# Patient Record
Sex: MALE | Race: WHITE | Employment: FULL TIME | URBAN - METROPOLITAN AREA
[De-identification: names, ages, dates, MRNs, and addresses within clinical notes are randomized per-mention and may not be internally consistent; named-entity substitution may affect disease eponyms.]

---

## 2017-04-07 ENCOUNTER — APPOINTMENT (OUTPATIENT)
Dept: OCCUPATIONAL MEDICINE | Age: 27
End: 2017-04-07
Attending: EMERGENCY MEDICINE

## 2017-05-18 ENCOUNTER — OFFICE VISIT (OUTPATIENT)
Dept: FAMILY MEDICINE CLINIC | Facility: CLINIC | Age: 27
End: 2017-05-18

## 2017-05-18 ENCOUNTER — APPOINTMENT (OUTPATIENT)
Dept: LAB | Age: 27
End: 2017-05-18
Attending: FAMILY MEDICINE
Payer: COMMERCIAL

## 2017-05-18 VITALS
HEART RATE: 60 BPM | BODY MASS INDEX: 28 KG/M2 | SYSTOLIC BLOOD PRESSURE: 95 MMHG | DIASTOLIC BLOOD PRESSURE: 60 MMHG | WEIGHT: 206 LBS

## 2017-05-18 DIAGNOSIS — Z00.00 ANNUAL PHYSICAL EXAM: Primary | ICD-10-CM

## 2017-05-18 DIAGNOSIS — Z00.00 ANNUAL PHYSICAL EXAM: ICD-10-CM

## 2017-05-18 DIAGNOSIS — E10.9 TYPE 1 DIABETES MELLITUS WITHOUT COMPLICATION (HCC): ICD-10-CM

## 2017-05-18 DIAGNOSIS — Z23 NEED FOR VACCINATION: ICD-10-CM

## 2017-05-18 DIAGNOSIS — Q67.6 PECTUS EXCAVATUM: ICD-10-CM

## 2017-05-18 PROCEDURE — 87389 HIV-1 AG W/HIV-1&-2 AB AG IA: CPT

## 2017-05-18 PROCEDURE — 36415 COLL VENOUS BLD VENIPUNCTURE: CPT

## 2017-05-18 PROCEDURE — 90715 TDAP VACCINE 7 YRS/> IM: CPT | Performed by: FAMILY MEDICINE

## 2017-05-18 PROCEDURE — 99395 PREV VISIT EST AGE 18-39: CPT | Performed by: FAMILY MEDICINE

## 2017-05-18 PROCEDURE — 80061 LIPID PANEL: CPT

## 2017-05-18 PROCEDURE — 90471 IMMUNIZATION ADMIN: CPT | Performed by: FAMILY MEDICINE

## 2017-05-18 NOTE — PROGRESS NOTES
Physical    Using his pump. Patient's past medical surgical family social history was reviewed. Review of Systems  Allergic: no environmental allergies or food allergies  Cardiovascular: no chest pain, irregular heartbeat, or palpitations.   Constitut diabetes mellitus without complication ([facility] 75.)  referrals  - Alexsander  - Endocrine Referral - External    4. Pectus excavatum  Doesn't want surgery.

## 2017-05-19 ENCOUNTER — TELEPHONE (OUTPATIENT)
Dept: FAMILY MEDICINE CLINIC | Facility: CLINIC | Age: 27
End: 2017-05-19

## 2017-05-19 NOTE — TELEPHONE ENCOUNTER
----- Message from Gala Figueroa DO sent at 5/19/2017  4:12 PM CDT -----  HIV test was negative. Cholesterol test was normal.  Very good.

## 2018-12-07 NOTE — LETTER
03/11/20      Kenzie Alexander  [address]       Dear Moses Irby records indicate that you have outstanding lab work and or testing that was ordered for you and has not yet been completed:  Orders Placed This Encou Alert and interactive, no focal deficits

## 2019-07-05 ENCOUNTER — HOSPITAL (OUTPATIENT)
Dept: OTHER | Age: 29
End: 2019-07-05

## 2019-07-05 LAB — GLUCOSE BLDC GLUCOMTR-MCNC: 127 MG/DL (ref 65–99)

## 2020-01-09 ENCOUNTER — NURSE TRIAGE (OUTPATIENT)
Dept: INTERNAL MEDICINE CLINIC | Facility: CLINIC | Age: 30
End: 2020-01-09

## 2020-01-09 ENCOUNTER — OFFICE VISIT (OUTPATIENT)
Dept: FAMILY MEDICINE CLINIC | Facility: CLINIC | Age: 30
End: 2020-01-09
Payer: COMMERCIAL

## 2020-01-09 VITALS
HEART RATE: 99 BPM | DIASTOLIC BLOOD PRESSURE: 80 MMHG | WEIGHT: 211.81 LBS | RESPIRATION RATE: 17 BRPM | SYSTOLIC BLOOD PRESSURE: 123 MMHG | BODY MASS INDEX: 29 KG/M2

## 2020-01-09 DIAGNOSIS — E10.9 TYPE 1 DIABETES MELLITUS WITHOUT COMPLICATION (HCC): Primary | ICD-10-CM

## 2020-01-09 LAB
APPEARANCE: CLEAR
BILIRUBIN: NEGATIVE
GLUCOSE (URINE DIPSTICK): 500 MG/DL
GLUCOSE BLOOD: 361
KETONES (URINE DIPSTICK): NEGATIVE MG/DL
LEUKOCYTES: NEGATIVE
MULTISTIX LOT#: NORMAL NUMERIC
NITRITE, URINE: NEGATIVE
OCCULT BLOOD: NEGATIVE
PH, URINE: 6 (ref 4.5–8)
PROTEIN (URINE DIPSTICK): NEGATIVE MG/DL
SPECIFIC GRAVITY: 1.02 (ref 1–1.03)
TEST STRIP LOT #: NORMAL NUMERIC
URINE-COLOR: YELLOW
UROBILINOGEN,SEMI-QN: 0.2 MG/DL (ref 0–1.9)

## 2020-01-09 PROCEDURE — 82962 GLUCOSE BLOOD TEST: CPT | Performed by: PHYSICIAN ASSISTANT

## 2020-01-09 PROCEDURE — 99213 OFFICE O/P EST LOW 20 MIN: CPT | Performed by: PHYSICIAN ASSISTANT

## 2020-01-09 PROCEDURE — 36416 COLLJ CAPILLARY BLOOD SPEC: CPT | Performed by: PHYSICIAN ASSISTANT

## 2020-01-09 PROCEDURE — 81003 URINALYSIS AUTO W/O SCOPE: CPT | Performed by: PHYSICIAN ASSISTANT

## 2020-01-09 NOTE — PROGRESS NOTES
HPI:   HPI   30year-old male is here in the office complaining of hypoglycemia for the past 4 days. Patient states that he has not monitored BG daily. He was diagnosed with Type 1 DM for the past 10 years, DKA couple times.  His BG was 26 early audra blanco family h/o   • Diabetes Paternal Grandfather    • Prostate Cancer Paternal Grandfather    • Breast Cancer Paternal Grandmother        Social History:   Social History    Socioeconomic History      Marital status: Single      Spouse name: Not on file Bike Helmet: Not Asked        Seat Belt: Not Asked        Self-Exams: Not Asked    Social History Narrative      Not on file      Review of Systems:   Review of Systems   Constitutional: Negative for activity change, chills, fatigue and fever.    HENT: Judy Mariee Primary    Relevant Orders    GLUCOSE BLOOD TEST (Completed)    URINALYSIS, AUTO, W/O SCOPE (Completed)    ALT (SGPT)    AST (SGOT)    BASIC METABOLIC PANEL (8)    HEMOGLOBIN A1C    CBC WITH DIFFERENTIAL WITH PLATELET    ASSAY, THYROID STIM HORMONE    ENDO

## 2020-01-09 NOTE — TELEPHONE ENCOUNTER
Action Requested: Summary for Provider     []  Critical Lab, Recommendations Needed  [] Need Additional Advice  [x]   FYI    []   Need Orders  [] Need Medications Sent to Pharmacy  []  Other     SUMMARY: Type 1 insulin dependent patient who uses insulin pu

## 2020-01-10 ENCOUNTER — TELEPHONE (OUTPATIENT)
Dept: FAMILY MEDICINE CLINIC | Facility: CLINIC | Age: 30
End: 2020-01-10

## 2021-05-17 ENCOUNTER — HOSPITAL ENCOUNTER (INPATIENT)
Facility: HOSPITAL | Age: 31
LOS: 3 days | Discharge: HOME | DRG: 638 | End: 2021-05-20
Attending: EMERGENCY MEDICINE | Admitting: HOSPITALIST
Payer: COMMERCIAL

## 2021-05-17 DIAGNOSIS — E10.10 TYPE 1 DIABETES MELLITUS WITH KETOACIDOSIS WITHOUT COMA (HCC): Primary | ICD-10-CM

## 2021-05-17 PROCEDURE — 99223 1ST HOSP IP/OBS HIGH 75: CPT | Performed by: HOSPITALIST

## 2021-05-17 RX ORDER — ONDANSETRON 2 MG/ML
4 INJECTION INTRAMUSCULAR; INTRAVENOUS EVERY 4 HOURS PRN
Status: ACTIVE | OUTPATIENT
Start: 2021-05-17 | End: 2021-05-18

## 2021-05-17 RX ORDER — ONDANSETRON 2 MG/ML
4 INJECTION INTRAMUSCULAR; INTRAVENOUS EVERY 6 HOURS PRN
Status: DISCONTINUED | OUTPATIENT
Start: 2021-05-17 | End: 2021-05-20

## 2021-05-17 RX ORDER — ACETAMINOPHEN 325 MG/1
650 TABLET ORAL EVERY 6 HOURS PRN
Status: DISCONTINUED | OUTPATIENT
Start: 2021-05-17 | End: 2021-05-20

## 2021-05-17 RX ORDER — DEXTROSE MONOHYDRATE 25 G/50ML
50 INJECTION, SOLUTION INTRAVENOUS
Status: DISCONTINUED | OUTPATIENT
Start: 2021-05-17 | End: 2021-05-20

## 2021-05-17 RX ORDER — ENOXAPARIN SODIUM 100 MG/ML
40 INJECTION SUBCUTANEOUS DAILY
Status: DISCONTINUED | OUTPATIENT
Start: 2021-05-17 | End: 2021-05-20

## 2021-05-17 RX ORDER — TEMAZEPAM 7.5 MG/1
15 CAPSULE ORAL NIGHTLY PRN
Status: DISCONTINUED | OUTPATIENT
Start: 2021-05-17 | End: 2021-05-20

## 2021-05-17 RX ORDER — SODIUM CHLORIDE 9 MG/ML
INJECTION, SOLUTION INTRAVENOUS CONTINUOUS
Status: DISCONTINUED | OUTPATIENT
Start: 2021-05-17 | End: 2021-05-20

## 2021-05-17 RX ORDER — SODIUM CHLORIDE 9 MG/ML
INJECTION, SOLUTION INTRAVENOUS CONTINUOUS
Status: DISCONTINUED | OUTPATIENT
Start: 2021-05-17 | End: 2021-05-18

## 2021-05-17 RX ORDER — METOCLOPRAMIDE HYDROCHLORIDE 5 MG/ML
10 INJECTION INTRAMUSCULAR; INTRAVENOUS EVERY 6 HOURS PRN
Status: DISCONTINUED | OUTPATIENT
Start: 2021-05-17 | End: 2021-05-20

## 2021-05-17 RX ORDER — ONDANSETRON 2 MG/ML
4 INJECTION INTRAMUSCULAR; INTRAVENOUS ONCE
Status: COMPLETED | OUTPATIENT
Start: 2021-05-17 | End: 2021-05-17

## 2021-05-17 NOTE — ED INITIAL ASSESSMENT (HPI)
Patient complains of malaise since yesterday, vomiting as well, states his sugars have been in the 400s

## 2021-05-17 NOTE — H&P
[facility]    PATIENT'S NAME: Zurdo Barbour   ATTENDING PHYSICIAN: Vianney Oquendo.  Princess Fátima MD   PATIENT ACCOUNT#:   [id number]    LOCATION:  [place] [address]  MEDICAL RECORD #:   [id number]       YOB: 1990  ADMISSION DATE: Oropharynx is clear. Dry mucous membranes. Normal hard and soft palate. Eyes:  Anicteric sclerae. NECK:  Supple. No lymphadenopathy. Trachea is midline. Full range of motion. LUNGS:  Clear to auscultation bilaterally. Normal respiratory effort.   H

## 2021-05-18 PROCEDURE — 99233 SBSQ HOSP IP/OBS HIGH 50: CPT | Performed by: HOSPITALIST

## 2021-05-18 PROCEDURE — 99222 1ST HOSP IP/OBS MODERATE 55: CPT | Performed by: INTERNAL MEDICINE

## 2021-05-18 RX ORDER — DEXTROSE MONOHYDRATE 25 G/50ML
50 INJECTION, SOLUTION INTRAVENOUS
Status: DISCONTINUED | OUTPATIENT
Start: 2021-05-18 | End: 2021-05-20

## 2021-05-18 NOTE — CONSULTS
[facility]    Report of Consultation    Aura Garcia Patient Status:  Inpatient    1990 MRN [id number]   Location [facility] 2W/SW Attending Aaron Elizondo MD   Hosp Day # 1 PCP Felix Vega MD     Date of Admi Diabetes Paternal Grandfather    • Prostate Cancer Paternal Grandfather    • Breast Cancer Paternal Grandmother       reports that he has never smoked. He has never used smokeless tobacco. He reports current alcohol use.  He reports that he does not use jann conjunctivae, sclera. , normal sclera and normal pupils  Throat/Neck: normal sound to voice. Normal hearing, normal speech  Respiratory:  Speaking in full sentences, non-labored.  no increased work of breathing, no audible wheezing    Skin:  normal moisture

## 2021-05-18 NOTE — ED PROVIDER NOTES
Patient Seen in: [facility] AND [facility] Emergency Department    History   Patient presents with:  Hyperglycemia      HPI    Patient with a history of type 1 diabetes presents to the ED complaining of high blood sugar that started yesterday after eating at a r tobacco: Never Used    Substance and Sexual Activity      Alcohol use: Yes        Comment: rarely      Drug use: No    Other Topics      Concerns:        Caffeine Concern: Yes          soda      ROS  Pertinent positives: Fatigue, nausea, vomiting, thirst deformity. Skin:     General: Skin is warm and dry. Neurological:      Mental Status: He is alert and oriented to person, place, and time.    Psychiatric:         Mood and Affect: Mood normal.         Behavior: Behavior normal.         ED Course WITH PLATELET    Narrative: The following orders were created for panel order CBC WITH DIFFERENTIAL WITH PLATELET.                   Procedure                               Abnormality         Status                                     --------- problems on file. to contribute to the complexity of this ED evaluation. ED Course: Patient presents to the ED with symptoms and presentation concerning for DKA and severe dehydration. Started on IV fluids and laboratory testing sent.   Hyperglycemia on

## 2021-05-18 NOTE — PLAN OF CARE
Patient is alert and oriented x4. Admitted for high blood sugars. Currently on insulin drip. Accucheck hourly. Educated on call light usage. Bed locked in a low position.    Problem: Patient Centered Care  Goal: Patient preferences are identified and integr stability  - Monitor arterial and/or venous puncture sites for bleeding and/or hematoma  - Assess quality of pulses, skin color and temperature  - Assess for signs of decreased coronary artery perfusion - ex.  Angina  - Evaluate fluid balance, assess for ed urine specific gravity, serum osmolarity and serum sodium as indicated or ordered  - Monitor response to interventions for patient's volume status, including labs, urine output, blood pressure (other measures as available)  - Encourage oral intake as appro

## 2021-05-18 NOTE — PROGRESS NOTES
[facility]    Progress Note    Darlene Dumont Patient Status:  Inpatient    1990 MRN [id number]   Location [facility] 2W/SW Attending Candance Lewis, MD   Hosp Day # 1 PCP Timoteo Mckeon MD       Subjective:     No co

## 2021-05-18 NOTE — PLAN OF CARE
Pt was transitioned of the insulin drip to insulin pump at 1pm. Will ctm and possible DC today     Problem: Diabetes/Glucose Control  Goal: Glucose maintained within prescribed range  Description: INTERVENTIONS:  - Monitor Blood Glucose as ordered  - Asses maintain glucose within target range  - Assess barriers to adequate nutritional intake and initiate nutrition consult as needed  - Instruct patient on self management of diabetes  Outcome: Progressing  Goal: Electrolytes maintained within normal limits  Christian Pelayo

## 2021-05-18 NOTE — DIABETES ED
[facility]  Inpatient Diabetes Clinician Note    Henry Sharma Patient Status:  Inpatient   1990 MRN [id number]  Location [facility] 2W/SW Attending Vernell Raman, [facility] Day # 1 PCP Tova Lanier MD      Patient

## 2021-05-19 PROCEDURE — 99232 SBSQ HOSP IP/OBS MODERATE 35: CPT | Performed by: INTERNAL MEDICINE

## 2021-05-19 PROCEDURE — 99232 SBSQ HOSP IP/OBS MODERATE 35: CPT | Performed by: HOSPITALIST

## 2021-05-19 NOTE — PAYOR COMM NOTE
--------------  ADMISSION REVIEW     Payor: Yessenia Hobson  #:  [id number]  Authorization Number: [id number]    Admit date: 5/17/21  Admit time: 11:15 PM       Admitting Physician: Delaney Santana MD  Attending Physician:  Alberto Dasilva Relation Age of Onset   • Allergies Other         family h/o   • High Cholesterol Other         family h/o   • Cancer Other         family h/o   • Diabetes Paternal Grandfather    • Prostate Cancer Paternal Grandfather    • Breast Cancer Paternal Zulema Corinne Normocephalic and atraumatic. Mouth/Throat:      Mouth: Mucous membranes are dry. Eyes:      General:         Right eye: No discharge. Left eye: No discharge.       Conjunctiva/sclera: Conjunctivae normal.   Neck:      Trachea: No tracheal de other components within normal limits   POCT GLUCOSE - Abnormal; Notable for the following components:    POC Glucose  291 (*)     All other components within normal limits   POCT GLUCOSE - Abnormal; Notable for the following components:    POC Glucose  25 130/78 119/78   Pulse: (!) 121 (!) 123 (!) 124 (!) 122   Resp:       Temp:       SpO2: 100% 100% 99% 99%   Weight:         *I personally reviewed and interpreted all ED vitals.     Pulse Ox: 99%, Room air, Normal     Monitor Interpretation: Tachycardia    D (Principal) Type I diabetes mellitus ([facility] 75.) E10.9 3/15/2012 Unknown                        Signed by Constanza Ty MD on 5/17/2021  7:19 PM            H&P - H&P Note      H&P signed by Christian Milner MD at 5/18/2021 12:14 PM      Author: Lionel Torres chills. No chest pain. No recent change in his diet or his insulin pump settings. Other 12-point review of systems negative. PHYSICAL EXAMINATION:  GENERAL:  Alert, oriented to time, place, and person, in moderate distress.   VITAL SIGNS:  Temperature (Right Upper Arm) Roszko, Denver Lema, TOMMY      Insulin Aspart Pen (NOVOLOG) 100 UNIT/ML flexpen 5 Units     Date Action Dose Route User    5/19/2021 1258 Given 5 Units Subcutaneous (Left Upper Arm) [facility]    5/19/2021 0944 Given 5 Units Subcutan Consult:  5/18/2021     Reason for Consultation:  DKA     History of Present Illness:  Chano Heath is a a(n) 35year old male with PMH as below who presented to the ER with nausea, vomiting and hyperglycemia  Patient has had Type 1 DM for about 1 with ketoacidosis without coma [facility])        Patient is a 35year old male with Type 1 DM, who is admitted for management of DKA likely due to bad pump site  He was started on an insulin drip  BG better, AG resolved  Clinically better  Will switch to inulin

## 2021-05-19 NOTE — PLAN OF CARE
Off the insulin drip since this am and switched over to novolog 6 units pradial + low dose sliding scale and levemir 40 units. Accu checks were still on higher side. Dr. Pham Juárez increased levemir to 55 units and will monitor patient for one more night.  Pt s Obtain nutritional consult as needed  - Evaluate fluid balance  Outcome: Progressing     Problem: METABOLIC/FLUID AND ELECTROLYTES - ADULT  Goal: Glucose maintained within prescribed range  Description: INTERVENTIONS:  - Monitor Blood Glucose as ordered  -

## 2021-05-19 NOTE — DIABETES ED
Patient did a return demonstration of insulin pen technique with demo pen and pad. He verbalized appropriate site for injections and importance of site rotation.

## 2021-05-19 NOTE — PROGRESS NOTES
Patient admitted with DKA  Yesterday once AG closed , he was transitioned to the insulin pump under supervision of the diabetes educator.    However, shortly after that BG went up in the 400s and the patient went back into DKA inspite of the pump running  T

## 2021-05-19 NOTE — PROGRESS NOTES
[facility]    Progress Note    Lexa Mckeon Patient Status:  Inpatient    1990 MRN [id number]   Location [facility] 2W/SW Attending Vicente Barker MD   Hosp Day # 2 PCP Ally Vences MD       Subjective:     No co

## 2021-05-19 NOTE — PROGRESS NOTES
[facility]    Endocrine Progress Note  [facility]    Liset Quach Patient Status:  Inpatient    1990 MRN [id number]   Location [facility] 2W/SW Attending Charmayne Cranker, MD   Hosp Day # 2 PCP Sonu Romero Q15 Min PRN  •  benzocaine-menthol (CEPACOL (SUGAR-FREE)) 1 lozenge, 1 lozenge, Oral, PRN  •  Enoxaparin Sodium (LOVENOX) 40 MG/0.4ML injection 40 mg, 40 mg, Subcutaneous, Daily  •  acetaminophen (TYLENOL) tab 650 mg, 650 mg, Oral, Q6H PRN  •  temazepam (R No results found.            Jericho Caruso MD  5/19/2021

## 2021-05-19 NOTE — PLAN OF CARE
Patient is alert and oriented x4. Insulin drip restarted 2120 per Dr. Kush Hardy due to blood glucose levels in the 400s. Started in algorithm 2.    Problem: Patient Centered Care  Goal: Patient preferences are identified and integrated in the patient's plan effectiveness of vasoactive medications to optimize hemodynamic stability  - Monitor arterial and/or venous puncture sites for bleeding and/or hematoma  - Assess quality of pulses, skin color and temperature  - Assess for signs of decreased coronary artery deficit  - Monitor intake, output and patient weight  - Monitor urine specific gravity, serum osmolarity and serum sodium as indicated or ordered  - Monitor response to interventions for patient's volume status, including labs, urine output, blood pressure

## 2021-05-20 ENCOUNTER — TELEPHONE (OUTPATIENT)
Dept: FAMILY MEDICINE CLINIC | Facility: CLINIC | Age: 31
End: 2021-05-20

## 2021-05-20 VITALS
TEMPERATURE: 98 F | WEIGHT: 225 LBS | OXYGEN SATURATION: 97 % | BODY MASS INDEX: 31 KG/M2 | SYSTOLIC BLOOD PRESSURE: 146 MMHG | HEART RATE: 80 BPM | RESPIRATION RATE: 15 BRPM | DIASTOLIC BLOOD PRESSURE: 83 MMHG

## 2021-05-20 PROCEDURE — 99239 HOSP IP/OBS DSCHRG MGMT >30: CPT | Performed by: HOSPITALIST

## 2021-05-20 RX ORDER — LORAZEPAM 2 MG/ML
4 INJECTION INTRAMUSCULAR ONCE
Status: DISCONTINUED | OUTPATIENT
Start: 2021-05-20 | End: 2021-05-20

## 2021-05-20 NOTE — PLAN OF CARE
Problem: Patient Centered Care  Goal: Patient preferences are identified and integrated in the patient's plan of care  Description: Interventions:  - What would you like us to know as we care for you?  Lani Colin   - Provide timely, complete, and accurate inform See additional Care Plan goals for specific interventions  5/20/2021 1140 by Ruth Ann Sandhu RN  Outcome: Completed  5/20/2021 0948 by Everett Chauhan RN  Outcome: Progressing     Problem: CARDIOVASCULAR - ADULT  Goal: Maintains optimal cardiac management of diabetes  5/20/2021 1140 by Jessica Cuellar, RN  Outcome: Completed  5/20/2021 0948 by Everett Flores, RN  Outcome: Progressing  Goal: Electrolytes maintained within normal limits  Description: INTERVENTIONS:  - Monitor labs and rh

## 2021-05-20 NOTE — PROGRESS NOTES
[facility]    Endocrine Progress Note  [facility]    Teresa Luna Patient Status:  Inpatient    1990 MRN [id number]   Location [facility] 2W/SW Attending Ant Ghotra MD   Hosp Day # 3 PCP Valeria Worthy Q6H PRN  •  glucose (DEX4) oral liquid 15 g, 15 g, Oral, Q15 Min PRN **OR** Glucose-Vitamin C (DEX-4) chewable tab 4 tablet, 4 tablet, Oral, Q15 Min PRN **OR** dextrose 50 % injection 50 mL, 50 mL, Intravenous, Q15 Min PRN **OR** glucose (DEX4) oral liquid

## 2021-05-20 NOTE — PLAN OF CARE
Problem: Patient Centered Care  Goal: Patient preferences are identified and integrated in the patient's plan of care  Description: Interventions:  - What would you like us to know as we care for you?  I give myself insulin  - Provide timely, complete, an decreased cardiac output  - Evaluate effectiveness of vasoactive medications to optimize hemodynamic stability  - Monitor arterial and/or venous puncture sites for bleeding and/or hematoma  - Assess quality of pulses, skin color and temperature  - Assess f signs and symptoms of volume excess or deficit  - Monitor intake, output and patient weight  - Monitor urine specific gravity, serum osmolarity and serum sodium as indicated or ordered  - Monitor response to interventions for patient's volume status, inclu

## 2021-05-20 NOTE — DISCHARGE SUMMARY
[place] [facility]    Discharge Summary    Lukasz Ordaz Patient Status:  Inpatient    1990 MRN [id number]   Location [facility] 2W/SW Attending No att. providers found   Hosp Day # 3 PCP Evelyn Sanchez MD     Date of Admis .0 05/20/2021    CREATSERUM 0.62 (L) 05/20/2021    BUN 11 05/20/2021     05/20/2021    K 3.5 05/20/2021     05/20/2021    CO2 28.0 05/20/2021     (H) 05/20/2021    CA 8.4 (L) 05/20/2021    ALB 2.9 (L) 09/30/2015    ALKPHO 58 09/30 JACOB  [facility],  [address]  [address] [place] [phone]    On 5/27/2021  @11:30      Consultants  Chat With All Active Members    Provider Role Specialty    Katelyn Woods MD  Consulting Physician  ENDOCRINOLOGY    Yamil Edmond MD  Consulting Phy

## 2021-05-20 NOTE — PROGRESS NOTES
Discharge order placed AVS printed and reviewed with patient. Prescriptions in hand, Medications next doses due and medication side effects reviewed and questions answered. IVS and telemetry removed at this time. All questions answered at this time.

## 2021-05-20 NOTE — PAYOR COMM NOTE
--------------  DISCHARGE REVIEW    Payor: Yessenia [facility] #:  [id number]  Authorization Number: [id number]    Admit date: 5/17/21  Admit time:  11:15 PM  Discharge Date: 5/20/2021 12:06 PM     Admitting Physician: Carlotta Pizano MD  At

## 2021-05-20 NOTE — TELEPHONE ENCOUNTER
Insulin aspart and NovoLog are the same thing. It is the generic equivalent. He should be able to get the refill from the hospitalist.  The hospitalist sent in prescription for both the detemir and the aspart insulin preparations. For me to give him any further refills he would need to see me. I have never actually seen him in the office.

## 2021-05-20 NOTE — PLAN OF CARE
Problem: Patient Centered Care  Goal: Patient preferences are identified and integrated in the patient's plan of care  Description: Interventions:  - What would you like us to know as we care for you?   - Provide timely, complete, and accurate informatio output  - Evaluate effectiveness of vasoactive medications to optimize hemodynamic stability  - Monitor arterial and/or venous puncture sites for bleeding and/or hematoma  - Assess quality of pulses, skin color and temperature  - Assess for signs of decrea of volume excess or deficit  - Monitor intake, output and patient weight  - Monitor urine specific gravity, serum osmolarity and serum sodium as indicated or ordered  - Monitor response to interventions for patient's volume status, including labs, urine ou

## 2021-05-21 ENCOUNTER — PATIENT OUTREACH (OUTPATIENT)
Dept: CASE MANAGEMENT | Age: 31
End: 2021-05-21

## 2021-05-21 DIAGNOSIS — Z02.9 ENCOUNTERS FOR UNSPECIFIED ADMINISTRATIVE PURPOSE: ICD-10-CM

## 2021-05-24 NOTE — PROGRESS NOTES
NCM placed call to patient for TCM, LM requesting a call to [phone] back with a condition update.

## 2021-05-27 ENCOUNTER — OFFICE VISIT (OUTPATIENT)
Dept: FAMILY MEDICINE CLINIC | Facility: CLINIC | Age: 31
End: 2021-05-27
Payer: COMMERCIAL

## 2021-05-27 VITALS
SYSTOLIC BLOOD PRESSURE: 115 MMHG | BODY MASS INDEX: 34.53 KG/M2 | HEART RATE: 87 BPM | DIASTOLIC BLOOD PRESSURE: 66 MMHG | WEIGHT: 220 LBS | HEIGHT: 67 IN

## 2021-05-27 DIAGNOSIS — E10.10 TYPE 1 DIABETES MELLITUS WITH KETOACIDOSIS WITHOUT COMA (HCC): Primary | ICD-10-CM

## 2021-05-27 PROCEDURE — 3008F BODY MASS INDEX DOCD: CPT | Performed by: PHYSICIAN ASSISTANT

## 2021-05-27 PROCEDURE — 99495 TRANSJ CARE MGMT MOD F2F 14D: CPT | Performed by: PHYSICIAN ASSISTANT

## 2021-05-27 PROCEDURE — 3074F SYST BP LT 130 MM HG: CPT | Performed by: PHYSICIAN ASSISTANT

## 2021-05-27 PROCEDURE — 3078F DIAST BP <80 MM HG: CPT | Performed by: PHYSICIAN ASSISTANT

## 2021-05-27 RX ORDER — PEN NEEDLE, DIABETIC 29 G X1/2"
NEEDLE, DISPOSABLE MISCELLANEOUS
COMMUNITY
Start: 2021-05-20

## 2021-05-27 RX ORDER — INSULIN LISPRO 100 [IU]/ML
INJECTION, SOLUTION INTRAVENOUS; SUBCUTANEOUS
COMMUNITY
Start: 2021-05-20

## 2021-05-27 NOTE — PROGRESS NOTES
HPI:    Liset Quach is a 35year old male here today for hospital follow up.    He was discharged from Inpatient hospital, [facility] AND [facility]  to Home   Admission Date: 5/17/21   Discharge Date: 5/20/21  Hospital Discharge Diagnoses (since 4/27/202 Misc,   ONETOUCH ULTRA BLUE In Vitro Strip,     No current facility-administered medications on file prior to visit.         HISTORY: reconciled and reviewed with patient  He  has a past medical history of Fracture of tibia, Learning disability, Type I (juv 220 lb (99.8 kg)   BMI 34.46 kg/m²    GENERAL: well developed, well nourished, in no apparent distress  SKIN: no rashes, no suspicious lesions  HEENT: atraumatic, normocephalic, ears and throat are clear  EYES: PERRLA, EOMI, conjunctiva are clear  NECK: davila of Significant Complications, Morbidity, and/or Mortality: moderate    Overall Risk:   moderate    Patient seen within 7 days from date of discharge.      Charity Abbott PA-C, 5/27/2021

## 2021-05-28 ENCOUNTER — TELEPHONE (OUTPATIENT)
Dept: INTERNAL MEDICINE CLINIC | Facility: CLINIC | Age: 31
End: 2021-05-28

## 2021-05-28 RX ORDER — INSULIN LISPRO 100 [IU]/ML
15 INJECTION, SOLUTION INTRAVENOUS; SUBCUTANEOUS 3 TIMES DAILY
Qty: 14 EACH | Refills: 2 | Status: SHIPPED | OUTPATIENT
Start: 2021-05-28 | End: 2021-06-27

## 2021-05-28 NOTE — TELEPHONE ENCOUNTER
A prior Julianne Guerra has been started      NovoLog Flex Pen 100 unit/ml pen injectors     Key:[id number]

## 2021-06-01 NOTE — PROGRESS NOTES
Multiple attempts to reach pt and messages left with no return call. Pt went in for HFU appt on 5/27/21 with PCP. Encounter closing.

## 2021-06-17 ENCOUNTER — TELEPHONE (OUTPATIENT)
Dept: FAMILY MEDICINE CLINIC | Facility: CLINIC | Age: 31
End: 2021-06-17

## 2021-07-23 ENCOUNTER — TELEPHONE (OUTPATIENT)
Dept: FAMILY MEDICINE CLINIC | Facility: CLINIC | Age: 31
End: 2021-07-23

## 2021-07-23 NOTE — TELEPHONE ENCOUNTER
Medication was changed to Humalog on 05/28/2021. Prior authorization request is for Novolog not Humalog as indicated by CSS below.

## 2021-07-23 NOTE — TELEPHONE ENCOUNTER
Prior Authorization:      •  HUMALOG 100 UNIT/ML Subcutaneous Solution, INJECT 100 UNITS DAILY VIA THE INSULIN PUMP SUBCUTANEOUS, Disp: , Rfl:     Key: [id number]  Patient Last Name: Marcella Luz  : 1990

## 2022-05-31 ENCOUNTER — HOSPITAL ENCOUNTER (EMERGENCY)
Facility: HOSPITAL | Age: 32
Discharge: HOME | End: 2022-05-31
Attending: EMERGENCY MEDICINE
Payer: COMMERCIAL

## 2022-05-31 VITALS
HEART RATE: 67 BPM | SYSTOLIC BLOOD PRESSURE: 124 MMHG | RESPIRATION RATE: 17 BRPM | WEIGHT: 210 LBS | OXYGEN SATURATION: 94 % | TEMPERATURE: 98 F | DIASTOLIC BLOOD PRESSURE: 79 MMHG | BODY MASS INDEX: 33 KG/M2

## 2022-05-31 DIAGNOSIS — E16.2 HYPOGLYCEMIA: Primary | ICD-10-CM

## 2022-05-31 DIAGNOSIS — T85.614A: ICD-10-CM

## 2022-05-31 LAB
ANION GAP SERPL CALC-SCNC: 4 MMOL/L (ref 0–18)
BASOPHILS # BLD AUTO: 0.02 X10(3) UL (ref 0–0.2)
BASOPHILS NFR BLD AUTO: 0.2 %
BUN BLD-MCNC: 14 MG/DL (ref 7–18)
BUN/CREAT SERPL: 15.9 (ref 10–20)
CALCIUM BLD-MCNC: 8.9 MG/DL (ref 8.5–10.1)
CHLORIDE SERPL-SCNC: 109 MMOL/L (ref 98–112)
CO2 SERPL-SCNC: 26 MMOL/L (ref 21–32)
CREAT BLD-MCNC: 0.88 MG/DL
DEPRECATED RDW RBC AUTO: 49 FL (ref 35.1–46.3)
EOSINOPHIL # BLD AUTO: 0.01 X10(3) UL (ref 0–0.7)
EOSINOPHIL NFR BLD AUTO: 0.1 %
ERYTHROCYTE [DISTWIDTH] IN BLOOD BY AUTOMATED COUNT: 14.6 % (ref 11–15)
GLUCOSE BLD-MCNC: 34 MG/DL (ref 70–99)
GLUCOSE BLDC GLUCOMTR-MCNC: 114 MG/DL (ref 70–99)
GLUCOSE BLDC GLUCOMTR-MCNC: 141 MG/DL (ref 70–99)
GLUCOSE BLDC GLUCOMTR-MCNC: 26 MG/DL (ref 70–99)
HCT VFR BLD AUTO: 49.5 %
HGB BLD-MCNC: 15.3 G/DL
IMM GRANULOCYTES # BLD AUTO: 0.02 X10(3) UL (ref 0–1)
IMM GRANULOCYTES NFR BLD: 0.2 %
LYMPHOCYTES # BLD AUTO: 1.42 X10(3) UL (ref 1–4)
LYMPHOCYTES NFR BLD AUTO: 17.6 %
MCH RBC QN AUTO: 28.2 PG (ref 26–34)
MCHC RBC AUTO-ENTMCNC: 30.9 G/DL (ref 31–37)
MCV RBC AUTO: 91.3 FL
MONOCYTES # BLD AUTO: 0.47 X10(3) UL (ref 0.1–1)
MONOCYTES NFR BLD AUTO: 5.8 %
NEUTROPHILS # BLD AUTO: 6.14 X10 (3) UL (ref 1.5–7.7)
NEUTROPHILS # BLD AUTO: 6.14 X10(3) UL (ref 1.5–7.7)
NEUTROPHILS NFR BLD AUTO: 76.1 %
OSMOLALITY SERPL CALC.SUM OF ELEC: 285 MOSM/KG (ref 275–295)
PLATELET # BLD AUTO: 171 10(3)UL (ref 150–450)
POTASSIUM SERPL-SCNC: 3.9 MMOL/L (ref 3.5–5.1)
RBC # BLD AUTO: 5.42 X10(6)UL
SODIUM SERPL-SCNC: 139 MMOL/L (ref 136–145)
WBC # BLD AUTO: 8.1 X10(3) UL (ref 4–11)

## 2022-05-31 PROCEDURE — 96374 THER/PROPH/DIAG INJ IV PUSH: CPT

## 2022-05-31 PROCEDURE — 82962 GLUCOSE BLOOD TEST: CPT

## 2022-05-31 PROCEDURE — 99284 EMERGENCY DEPT VISIT MOD MDM: CPT

## 2022-05-31 PROCEDURE — 85025 COMPLETE CBC W/AUTO DIFF WBC: CPT | Performed by: EMERGENCY MEDICINE

## 2022-05-31 PROCEDURE — 80048 BASIC METABOLIC PNL TOTAL CA: CPT | Performed by: EMERGENCY MEDICINE

## 2022-05-31 RX ORDER — DEXTROSE MONOHYDRATE 25 G/50ML
INJECTION, SOLUTION INTRAVENOUS
Status: COMPLETED
Start: 2022-05-31 | End: 2022-05-31

## 2022-05-31 RX ORDER — DEXTROSE MONOHYDRATE 25 G/50ML
50 INJECTION, SOLUTION INTRAVENOUS ONCE
Status: COMPLETED | OUTPATIENT
Start: 2022-05-31 | End: 2022-05-31

## 2022-05-31 NOTE — ED INITIAL ASSESSMENT (HPI)
Patient states his family told him he had unresponsive episode pta, states he has been having problems with his insulin pump, states it read 45 pta, patient is alert and oriented x3 at present, denies symptoms

## 2022-11-11 ENCOUNTER — HOSPITAL ENCOUNTER (OUTPATIENT)
Facility: HOSPITAL | Age: 32
Setting detail: OBSERVATION
Discharge: HOME | End: 2022-11-12
Attending: EMERGENCY MEDICINE | Admitting: HOSPITALIST
Payer: COMMERCIAL

## 2022-11-11 DIAGNOSIS — E10.65 TYPE 1 DIABETES MELLITUS WITH HYPERGLYCEMIA (HCC): Primary | ICD-10-CM

## 2022-11-11 LAB
ANION GAP SERPL CALC-SCNC: 11 MMOL/L (ref 0–18)
BASOPHILS # BLD AUTO: 0.01 X10(3) UL (ref 0–0.2)
BASOPHILS NFR BLD AUTO: 0.1 %
BILIRUB UR QL: NEGATIVE
BUN BLD-MCNC: 22 MG/DL (ref 7–18)
BUN/CREAT SERPL: 20.4 (ref 10–20)
CALCIUM BLD-MCNC: 8.8 MG/DL (ref 8.5–10.1)
CHLORIDE SERPL-SCNC: 102 MMOL/L (ref 98–112)
CLARITY UR: CLEAR
CO2 SERPL-SCNC: 23 MMOL/L (ref 21–32)
COLOR UR: YELLOW
CREAT BLD-MCNC: 1.08 MG/DL
DEPRECATED RDW RBC AUTO: 41.6 FL (ref 35.1–46.3)
EOSINOPHIL # BLD AUTO: 0.03 X10(3) UL (ref 0–0.7)
EOSINOPHIL NFR BLD AUTO: 0.4 %
ERYTHROCYTE [DISTWIDTH] IN BLOOD BY AUTOMATED COUNT: 12.9 % (ref 11–15)
EST. AVERAGE GLUCOSE BLD GHB EST-MCNC: 117 MG/DL (ref 68–126)
FLUAV + FLUBV RNA SPEC NAA+PROBE: NEGATIVE
FLUAV + FLUBV RNA SPEC NAA+PROBE: NEGATIVE
GFR SERPLBLD BASED ON 1.73 SQ M-ARVRAT: 94 ML/MIN/1.73M2 (ref 60–?)
GLUCOSE BLD-MCNC: 311 MG/DL (ref 70–99)
GLUCOSE BLDC GLUCOMTR-MCNC: 132 MG/DL (ref 70–99)
GLUCOSE BLDC GLUCOMTR-MCNC: 133 MG/DL (ref 70–99)
GLUCOSE BLDC GLUCOMTR-MCNC: 144 MG/DL (ref 70–99)
GLUCOSE BLDC GLUCOMTR-MCNC: 166 MG/DL (ref 70–99)
GLUCOSE BLDC GLUCOMTR-MCNC: 226 MG/DL (ref 70–99)
GLUCOSE BLDC GLUCOMTR-MCNC: 232 MG/DL (ref 70–99)
GLUCOSE BLDC GLUCOMTR-MCNC: 297 MG/DL (ref 70–99)
GLUCOSE BLDC GLUCOMTR-MCNC: 48 MG/DL (ref 70–99)
GLUCOSE BLDC GLUCOMTR-MCNC: 61 MG/DL (ref 70–99)
GLUCOSE BLDC GLUCOMTR-MCNC: 87 MG/DL (ref 70–99)
GLUCOSE BLDC GLUCOMTR-MCNC: 95 MG/DL (ref 70–99)
GLUCOSE UR-MCNC: >=500 MG/DL
HBA1C MFR BLD: 5.7 % (ref ?–5.7)
HCT VFR BLD AUTO: 47.3 %
HGB BLD-MCNC: 16 G/DL
HGB UR QL STRIP.AUTO: NEGATIVE
IMM GRANULOCYTES # BLD AUTO: 0.02 X10(3) UL (ref 0–1)
IMM GRANULOCYTES NFR BLD: 0.3 %
KETONES UR-MCNC: 80 MG/DL
LEUKOCYTE ESTERASE UR QL STRIP.AUTO: NEGATIVE
LYMPHOCYTES # BLD AUTO: 0.92 X10(3) UL (ref 1–4)
LYMPHOCYTES NFR BLD AUTO: 12.9 %
MCH RBC QN AUTO: 29.6 PG (ref 26–34)
MCHC RBC AUTO-ENTMCNC: 33.8 G/DL (ref 31–37)
MCV RBC AUTO: 87.6 FL
MONOCYTES # BLD AUTO: 0.28 X10(3) UL (ref 0.1–1)
MONOCYTES NFR BLD AUTO: 3.9 %
NEUTROPHILS # BLD AUTO: 5.88 X10 (3) UL (ref 1.5–7.7)
NEUTROPHILS # BLD AUTO: 5.88 X10(3) UL (ref 1.5–7.7)
NEUTROPHILS NFR BLD AUTO: 82.4 %
NITRITE UR QL STRIP.AUTO: NEGATIVE
OSMOLALITY SERPL CALC.SUM OF ELEC: 297 MOSM/KG (ref 275–295)
PH UR: 5 [PH] (ref 5–8)
PLATELET # BLD AUTO: 155 10(3)UL (ref 150–450)
POTASSIUM SERPL-SCNC: 4.6 MMOL/L (ref 3.5–5.1)
PROT UR-MCNC: NEGATIVE MG/DL
RBC # BLD AUTO: 5.4 X10(6)UL
RSV RNA SPEC NAA+PROBE: NEGATIVE
SARS-COV-2 RNA RESP QL NAA+PROBE: NOT DETECTED
SODIUM SERPL-SCNC: 136 MMOL/L (ref 136–145)
SP GR UR STRIP: 1.03 (ref 1–1.03)
UROBILINOGEN UR STRIP-ACNC: <2
VIT C UR-MCNC: NEGATIVE MG/DL
WBC # BLD AUTO: 7.1 X10(3) UL (ref 4–11)

## 2022-11-11 PROCEDURE — 99219 INITIAL OBSERVATION CARE,LEVL II: CPT | Performed by: HOSPITALIST

## 2022-11-11 RX ORDER — NICOTINE POLACRILEX 4 MG
15 LOZENGE BUCCAL
Status: DISCONTINUED | OUTPATIENT
Start: 2022-11-11 | End: 2022-11-12

## 2022-11-11 RX ORDER — NICOTINE POLACRILEX 4 MG
30 LOZENGE BUCCAL
Status: DISCONTINUED | OUTPATIENT
Start: 2022-11-11 | End: 2022-11-12

## 2022-11-11 RX ORDER — TEMAZEPAM 15 MG/1
15 CAPSULE ORAL NIGHTLY PRN
Status: DISCONTINUED | OUTPATIENT
Start: 2022-11-11 | End: 2022-11-12

## 2022-11-11 RX ORDER — SODIUM CHLORIDE 9 MG/ML
INJECTION, SOLUTION INTRAVENOUS CONTINUOUS
Status: DISCONTINUED | OUTPATIENT
Start: 2022-11-11 | End: 2022-11-12

## 2022-11-11 RX ORDER — INSULIN ASPART 100 [IU]/ML
0.1 INJECTION, SOLUTION INTRAVENOUS; SUBCUTANEOUS ONCE
Status: COMPLETED | OUTPATIENT
Start: 2022-11-11 | End: 2022-11-11

## 2022-11-11 RX ORDER — ENOXAPARIN SODIUM 100 MG/ML
40 INJECTION SUBCUTANEOUS DAILY
Status: DISCONTINUED | OUTPATIENT
Start: 2022-11-11 | End: 2022-11-12

## 2022-11-11 RX ORDER — ACETAMINOPHEN 500 MG
500 TABLET ORAL EVERY 4 HOURS PRN
Status: DISCONTINUED | OUTPATIENT
Start: 2022-11-11 | End: 2022-11-12

## 2022-11-11 RX ORDER — PROCHLORPERAZINE EDISYLATE 5 MG/ML
5 INJECTION INTRAMUSCULAR; INTRAVENOUS EVERY 8 HOURS PRN
Status: DISCONTINUED | OUTPATIENT
Start: 2022-11-11 | End: 2022-11-12

## 2022-11-11 RX ORDER — ONDANSETRON 2 MG/ML
4 INJECTION INTRAMUSCULAR; INTRAVENOUS ONCE
Status: COMPLETED | OUTPATIENT
Start: 2022-11-11 | End: 2022-11-11

## 2022-11-11 RX ORDER — DEXTROSE MONOHYDRATE 25 G/50ML
50 INJECTION, SOLUTION INTRAVENOUS
Status: DISCONTINUED | OUTPATIENT
Start: 2022-11-11 | End: 2022-11-12

## 2022-11-11 RX ORDER — SODIUM CHLORIDE 9 MG/ML
125 INJECTION, SOLUTION INTRAVENOUS CONTINUOUS
Status: DISCONTINUED | OUTPATIENT
Start: 2022-11-11 | End: 2022-11-12

## 2022-11-11 RX ORDER — ONDANSETRON 2 MG/ML
4 INJECTION INTRAMUSCULAR; INTRAVENOUS EVERY 6 HOURS PRN
Status: DISCONTINUED | OUTPATIENT
Start: 2022-11-11 | End: 2022-11-12

## 2022-11-11 RX ORDER — INSULIN ASPART 100 [IU]/ML
0.15 INJECTION, SOLUTION INTRAVENOUS; SUBCUTANEOUS ONCE
Status: DISCONTINUED | OUTPATIENT
Start: 2022-11-11 | End: 2022-11-11

## 2022-11-11 NOTE — H&P
[facility]    PATIENT'S NAME: Yane Luz   ATTENDING PHYSICIAN: Hanna Galvez MD   PATIENT ACCOUNT#:   [de-identified]    LOCATION:  [address]  MEDICAL RECORD #:   [id number]       YOB: 1990  ADMISSION DATE:       11/11/2022    HISTORY AND PHYSICAL EXAMINATION    CHIEF COMPLAINT:  Hyperglycemia, malfunctioning insulin pump. HISTORY OF PRESENT ILLNESS:  The patient is a 67-year-old  male with diabetes mellitus type 1 who presented to the emergency department for evaluation of progressive hyperglycemia since yesterday despite attempts to bolus himself and change the insulin pump location. This morning he felt nauseous with abdominal cramps and vomited 2 times. Came into the emergency department for evaluation. CBC was unremarkable. Chemistry unremarkable. Bicarb 23. He had small acetone in his blood. Glucose 232 and 311 on chemistry panel. The patient was started on IV fluids and given 10 units of short-acting insulin, and he will be admitted to hospital for further management. Insulin pump was switched off. PAST MEDICAL HISTORY:  Diabetes mellitus type 1 insulin pump-dependent, obstructive sleep apnea. Denies any other medical problems. PAST SURGICAL HISTORY:  Tonsillectomy, nasal septoplasty, lip surgery, adenoidectomy. MEDICATIONS:  Please see medication reconciliation list.    FAMILY HISTORY:  Positive for diabetes mellitus type 2 in both parents. SOCIAL HISTORY:  No tobacco, alcohol, or drug use. Lives with his family. Independent in his basic activities of daily living. REVIEW OF SYSTEMS:  As mentioned above, persistent hyperglycemia despite bolusing himself. He was able to bring it down to 99 last night after bolusing, but this morning woke up with 268 blood glucose. He felt abdominal cramps and vomited twice. Other 12-point review of systems negative. The patient did not change his diet in the last 2 to 3 days.       PHYSICAL EXAMINATION:    GENERAL:  Alert. Oriented to time, place, and person. No acute distress. VITAL SIGNS:  Temperature 98.0, pulse 78, respiratory rate 14, blood pressure 122/76, pulse ox 100% on room air. HEENT:  Atraumatic. Oropharynx clear, dry mucous membranes. Ears, nose normal.  Eyes:  Anicteric sclerae. NECK:  Supple. No lymphadenopathy. Trachea midline. Full range of motion. LUNGS:  Clear to auscultation bilaterally. Normal respiratory effort. HEART:  Regular rate and rhythm. S1, S2 auscultated. No murmur. ABDOMEN:  Soft, nondistended, no tenderness. Positive bowel sounds. EXTREMITIES:  No peripheral edema, clubbing, or cyanosis. NEUROLOGIC:  Motor and sensory intact. ASSESSMENT AND PLAN:  Possible malfunctioning insulin pump. Basal rate 25.5 per hour. We will start on 15 units b.i.d. of detemir and sliding scale, endocrinology consult, IV fluids, nausea control. Further recommendations to follow.     Dictated By Gerardo Chaidez MD  d: 11/11/2022 11:14:49  t: 11/11/2022 11:19:34  Job [id number]  FB/

## 2022-11-11 NOTE — ED INITIAL ASSESSMENT (HPI)
Patient complains of abd pain, headache, vomiting since yesterday, states his sugars have been reading in the 300s since last night

## 2022-11-12 VITALS
RESPIRATION RATE: 18 BRPM | BODY MASS INDEX: 33 KG/M2 | SYSTOLIC BLOOD PRESSURE: 119 MMHG | OXYGEN SATURATION: 99 % | DIASTOLIC BLOOD PRESSURE: 82 MMHG | WEIGHT: 209 LBS | HEART RATE: 66 BPM | TEMPERATURE: 98 F

## 2022-11-12 LAB
ANION GAP SERPL CALC-SCNC: 4 MMOL/L (ref 0–18)
BASOPHILS # BLD AUTO: 0.02 X10(3) UL (ref 0–0.2)
BASOPHILS NFR BLD AUTO: 0.4 %
BUN BLD-MCNC: 16 MG/DL (ref 7–18)
BUN/CREAT SERPL: 15.8 (ref 10–20)
CALCIUM BLD-MCNC: 8 MG/DL (ref 8.5–10.1)
CHLORIDE SERPL-SCNC: 107 MMOL/L (ref 98–112)
CO2 SERPL-SCNC: 27 MMOL/L (ref 21–32)
CREAT BLD-MCNC: 1.01 MG/DL
DEPRECATED RDW RBC AUTO: 41.5 FL (ref 35.1–46.3)
EOSINOPHIL # BLD AUTO: 0.06 X10(3) UL (ref 0–0.7)
EOSINOPHIL NFR BLD AUTO: 1.3 %
ERYTHROCYTE [DISTWIDTH] IN BLOOD BY AUTOMATED COUNT: 13.1 % (ref 11–15)
GFR SERPLBLD BASED ON 1.73 SQ M-ARVRAT: 102 ML/MIN/1.73M2 (ref 60–?)
GLUCOSE BLD-MCNC: 287 MG/DL (ref 70–99)
GLUCOSE BLDC GLUCOMTR-MCNC: 246 MG/DL (ref 70–99)
GLUCOSE BLDC GLUCOMTR-MCNC: 58 MG/DL (ref 70–99)
GLUCOSE BLDC GLUCOMTR-MCNC: 75 MG/DL (ref 70–99)
GLUCOSE BLDC GLUCOMTR-MCNC: 85 MG/DL (ref 70–99)
HCT VFR BLD AUTO: 42.5 %
HGB BLD-MCNC: 14.1 G/DL
IMM GRANULOCYTES # BLD AUTO: 0.01 X10(3) UL (ref 0–1)
IMM GRANULOCYTES NFR BLD: 0.2 %
LYMPHOCYTES # BLD AUTO: 1.12 X10(3) UL (ref 1–4)
LYMPHOCYTES NFR BLD AUTO: 24.9 %
MCH RBC QN AUTO: 28.9 PG (ref 26–34)
MCHC RBC AUTO-ENTMCNC: 33.2 G/DL (ref 31–37)
MCV RBC AUTO: 87.1 FL
MONOCYTES # BLD AUTO: 0.27 X10(3) UL (ref 0.1–1)
MONOCYTES NFR BLD AUTO: 6 %
NEUTROPHILS # BLD AUTO: 3.01 X10 (3) UL (ref 1.5–7.7)
NEUTROPHILS # BLD AUTO: 3.01 X10(3) UL (ref 1.5–7.7)
NEUTROPHILS NFR BLD AUTO: 67.2 %
OSMOLALITY SERPL CALC.SUM OF ELEC: 298 MOSM/KG (ref 275–295)
PLATELET # BLD AUTO: 149 10(3)UL (ref 150–450)
POTASSIUM SERPL-SCNC: 4.6 MMOL/L (ref 3.5–5.1)
RBC # BLD AUTO: 4.88 X10(6)UL
SODIUM SERPL-SCNC: 138 MMOL/L (ref 136–145)
WBC # BLD AUTO: 4.5 X10(3) UL (ref 4–11)

## 2022-11-12 PROCEDURE — 99217 OBSERVATION CARE DISCHARGE: CPT | Performed by: HOSPITALIST

## 2022-11-12 NOTE — PLAN OF CARE
HS blood sugar 95, snack and juice provided. Patient hypoglycemic early this AM that improved after glucose drink and snack. Ambulating ad mary. Insulin pump and CGM maintained and in place. Discharge home once medically cleared. Problem: Patient Centered Care  Goal: Patient preferences are identified and integrated in the patient's plan of care  Description: Interventions:  - What would you like us to know as we care for you?  \"I have my insulin pump and CGM on\"  - Provide timely, complete, and accurate information to patient/family  - Incorporate patient and family knowledge, values, beliefs, and cultural backgrounds into the planning and delivery of care  - Encourage patient/family to participate in care and decision-making at the level they choose  - Honor patient and family perspectives and choices  Outcome: Progressing     Problem: Patient/Family Goals  Goal: Patient/Family Long Term Goal  Description: Patient's Long Term Goal: return home    Interventions:  - insulin pump monitoring  - See additional Care Plan goals for specific interventions  Outcome: Progressing  Goal: Patient/Family Short Term Goal  Description: Patient's Short Term Goal: maintain normal blood sugar levels    Interventions:   - accucheck ACHS  - See additional Care Plan goals for specific interventions  Outcome: Progressing     Problem: Diabetes/Glucose Control  Goal: Glucose maintained within prescribed range  Description: INTERVENTIONS:  - Monitor Blood Glucose as ordered  - Assess for signs and symptoms of hyperglycemia and hypoglycemia  - Administer ordered medications to maintain glucose within target range  - Assess barriers to adequate nutritional intake and initiate nutrition consult as needed  - Instruct patient on self management of diabetes  Outcome: Progressing

## 2022-11-12 NOTE — RESPIRATORY THERAPY NOTE
BART ASSESSMENT:    Pt. does have a previous diagnosis of BART.  Pt does not routinely use a CPAP device at home    CPAP INITIATION:    Pt to be placed on CPAP: no  Pt refused: yes

## 2022-11-12 NOTE — DISCHARGE SUMMARY
[facility]    Discharge Summary    [facility] Patient Status:  Observation    1990 MRN [id number]   Location [facility] 3W/SW Attending Isaias Gaviria MD   Hosp Day # 0 PCP Júnior Koenig MD     Date of Admission: 2022      Date of Discharge: 22    Admitting Diagnosis: Type 1 diabetes mellitus with hyperglycemia [facility]) [E10.65]    Hospital Discharge Diagnoses: DM type 1    Lace+ Score: 32  59-90 High Risk  29-58 Medium Risk  0-28   Low Risk. TCM Follow-Up Recommendation:  LACE 29-58: Moderate Risk of readmission after discharge from the hospital.          Problem List: Patient Active Problem List:     Type I diabetes mellitus ([facility] [address]     Sleep apnea     Diabetic ketoacidosis without coma associated with type 1 diabetes mellitus (HCC)     Pectus excavatum     Type 1 diabetes mellitus with ketoacidosis without coma (HCC)     Type 1 diabetes mellitus with hyperglycemia (HCC)        Physical Exam:     Gen: No acute distress, alert and oriented x3  Pulm: Lungs clear, normal respiratory effort  CV: Heart with regular rate and rhythm  Abd: Abdomen soft, nontender, nondistended, bowel sounds present  Neuro: No acute focal deficits  MSK: Full range of motion in extremities  Skin: Warm and dry  Psych: Normal affect  Ext: no c/c/e      History of Present Illness: Per Dr Francine Adams    The patient is a 71-year-old  male with diabetes mellitus type 1 who presented to the emergency department for evaluation of progressive hyperglycemia since yesterday despite attempts to bolus himself and change the insulin pump location. This morning he felt nauseous with abdominal cramps and vomited 2 times. Came into the emergency department for evaluation. CBC was unremarkable. Chemistry unremarkable. Bicarb 23. He had small acetone in his blood. Glucose 232 and 311 on chemistry panel.   The patient was started on IV fluids and given 10 units of short-acting insulin, and he will be admitted to hospital for further management. Insulin pump was switched off. Hospital Course: Patient was seen by endocrinology. Insulin pump was adjusted and patients blood sugars improved and his symptoms improved. Stable for home.      Discharge Condition: Stable    Discharge Medications:      Discharge Medications      CONTINUE taking these medications      Instructions Prescription details   BD Insulin Syringe U/F 30G X 1/2\" 0.3 ML Misc  Generic drug: Insulin Syringe-Needle U-100       Refills: 0     BD Pen Needle Short U/F 31G X 8 MM Misc  Generic drug: Insulin Pen Needle       Refills: 0     HumaLOG 100 UNIT/ML Soln  Generic drug: insulin lispro      INJECT 100 UNITS DAILY VIA THE INSULIN PUMP SUBCUTANEOUS   Refills: 0     OneTouch Ultra Blue Strp       Refills: 0                Queenie Mccallum MD  11/12/2022  1:04 PM    Greater than 30 minutes spent on preparation and coordination of this discharge

## 2022-11-14 ENCOUNTER — PATIENT OUTREACH (OUTPATIENT)
Dept: CASE MANAGEMENT | Age: 32
End: 2022-11-14

## 2022-11-14 NOTE — PROGRESS NOTES
NCM placed call to patient for TCM, LM requesting a call back to [phone] with a condition update.

## 2022-11-15 NOTE — PROGRESS NOTES
NCM placed call to patient for TCM, LM requesting a call back to [phone] with a condition update.

## 2022-11-16 NOTE — PROGRESS NOTES
Left message on mailbox for pt to call NCM back for TCM. NCM contact information included in message.         Follow up With Specialties Details Why Anne Marie Ríos MD ENDOCRINOLOGY Schedule an appointment as soon as possible for a visit in 1 week(s)  [address] [place] [address] Sirena Bolaños   [phone]

## 2023-03-27 PROCEDURE — 3044F HG A1C LEVEL LT 7.0%: CPT | Performed by: PHYSICIAN ASSISTANT

## 2023-03-27 PROCEDURE — 3061F NEG MICROALBUMINURIA REV: CPT | Performed by: PHYSICIAN ASSISTANT

## 2023-03-28 LAB
ALBUMIN/GLOBULIN RATIO: 1.7 (CALC) (ref 1–2.5)
ALBUMIN: 4.1 G/DL (ref 3.6–5.1)
ALKALINE PHOSPHATASE: 60 U/L (ref 36–130)
ALT: 31 U/L (ref 9–46)
AST: 31 U/L (ref 10–40)
BILIRUBIN, TOTAL: 0.9 MG/DL (ref 0.2–1.2)
BUN: 14 MG/DL (ref 7–25)
CALCIUM: 9 MG/DL (ref 8.6–10.3)
CARBON DIOXIDE: 28 MMOL/L (ref 20–32)
CHLORIDE: 104 MMOL/L (ref 98–110)
CHOL/HDLC RATIO: 2.3 (CALC)
CHOLESTEROL, TOTAL: 132 MG/DL
CREATININE, RANDOM URINE: 113 MG/DL (ref 20–320)
CREATININE: 0.87 MG/DL (ref 0.6–1.26)
EGFR: 118 ML/MIN/1.73M2
GLOBULIN: 2.4 G/DL (CALC) (ref 1.9–3.7)
GLUCOSE: 101 MG/DL (ref 65–99)
HDL CHOLESTEROL: 57 MG/DL
HEMOGLOBIN A1C: 6 % OF TOTAL HGB
LDL-CHOLESTEROL: 66 MG/DL (CALC)
MICROALBUMIN/CREATININE RATIO, RANDOM URINE: 5 MCG/MG CREAT
MICROALBUMIN: 0.6 MG/DL
NON-HDL CHOLESTEROL: 75 MG/DL (CALC)
POTASSIUM: 4.1 MMOL/L (ref 3.5–5.3)
PROTEIN, TOTAL: 6.5 G/DL (ref 6.1–8.1)
SODIUM: 139 MMOL/L (ref 135–146)
TRIGLYCERIDES: 33 MG/DL

## 2023-04-05 ENCOUNTER — OFFICE VISIT (OUTPATIENT)
Dept: FAMILY MEDICINE CLINIC | Facility: CLINIC | Age: 33
End: 2023-04-05

## 2023-04-05 VITALS
DIASTOLIC BLOOD PRESSURE: 68 MMHG | HEIGHT: 67 IN | SYSTOLIC BLOOD PRESSURE: 103 MMHG | BODY MASS INDEX: 35.47 KG/M2 | HEART RATE: 69 BPM | WEIGHT: 226 LBS

## 2023-04-05 DIAGNOSIS — Z00.00 ROUTINE GENERAL MEDICAL EXAMINATION AT A HEALTH CARE FACILITY: Primary | ICD-10-CM

## 2023-04-05 DIAGNOSIS — E55.9 VITAMIN D DEFICIENCY: ICD-10-CM

## 2023-04-05 DIAGNOSIS — E10.9 TYPE 1 DIABETES MELLITUS WITHOUT COMPLICATION (HCC): ICD-10-CM

## 2023-04-05 PROCEDURE — 3078F DIAST BP <80 MM HG: CPT | Performed by: PHYSICIAN ASSISTANT

## 2023-04-05 PROCEDURE — 90471 IMMUNIZATION ADMIN: CPT | Performed by: PHYSICIAN ASSISTANT

## 2023-04-05 PROCEDURE — 3008F BODY MASS INDEX DOCD: CPT | Performed by: PHYSICIAN ASSISTANT

## 2023-04-05 PROCEDURE — 99395 PREV VISIT EST AGE 18-39: CPT | Performed by: PHYSICIAN ASSISTANT

## 2023-04-05 PROCEDURE — 3074F SYST BP LT 130 MM HG: CPT | Performed by: PHYSICIAN ASSISTANT

## 2023-04-05 PROCEDURE — 90677 PCV20 VACCINE IM: CPT | Performed by: PHYSICIAN ASSISTANT

## 2023-06-02 ENCOUNTER — PATIENT MESSAGE (OUTPATIENT)
Dept: FAMILY MEDICINE CLINIC | Facility: CLINIC | Age: 33
End: 2023-06-02

## 2023-06-02 NOTE — TELEPHONE ENCOUNTER
Min, please advise on pended letter. ----- Message -----  From: Shreyas Pérez  Sent: 6/2/2023   6:15 AM CDT  To: Em Triage Support  Subject: Mario White I'm going to need a doctor's note from you informing work that I can keep my phone near me due to the [address] they want me to put it in the locker and I will not be able to hear it go off if my Sugar goes low.

## 2023-08-16 ENCOUNTER — OFFICE VISIT (OUTPATIENT)
Dept: PODIATRY CLINIC | Facility: CLINIC | Age: 33
End: 2023-08-16

## 2023-08-16 DIAGNOSIS — L60.8 DISCOLORATION AND THICKENING OF NAILS BOTH FEET: Primary | ICD-10-CM

## 2023-08-16 DIAGNOSIS — E10.65 TYPE 1 DIABETES MELLITUS WITH HYPERGLYCEMIA (HCC): ICD-10-CM

## 2023-08-16 PROCEDURE — 99203 OFFICE O/P NEW LOW 30 MIN: CPT | Performed by: PODIATRIST

## 2023-08-16 PROCEDURE — 11755 BIOPSY NAIL UNIT: CPT | Performed by: PODIATRIST

## 2023-08-16 NOTE — PROGRESS NOTES
[facility]  Progress Note    Armando Huff is a 33year old male. Patient presents with:  Diabetic Foot Care: Copnsult - last UqV3R=0.0 from 3/27/23 and LOV with endo Dr Isamar Arias was 4/10/23 - he thinks he has toenail fungus on the R foot 3rd toe - this AM his BS=68        HPI:     This is a pleasant type 1 diabetic male. He presents to clinic today for diabetic foot exam.  He is also concerned for possible fungal toenails. He states this has been present for a few months and worsening. He has been putting antifungal cream which has slightly helped. Allergies: Patient has no known allergies.    Current Outpatient Medications   Medication Sig Dispense Refill    HUMALOG 100 UNIT/ML Subcutaneous Solution INJECT 100 UNITS DAILY VIA THE INSULIN PUMP SUBCUTANEOUS      BD INSULIN SYRINGE U/F 30G X 1/2\" 0.3 ML Does not apply Misc       BD PEN NEEDLE SHORT U/F 31G X 8 MM Does not apply Misc       ONETOUCH ULTRA BLUE In Vitro Strip         Past Medical History:   Diagnosis Date    Fracture of tibia     Learning disability     mild    Type I (juvenile type) diabetes mellitus without mention of complication, not stated as uncontrolled     Diagnosed 2011    Varicella 1995      Past Surgical History:   Procedure Laterality Date    ADENOIDECTOMY      LIP SURGERY PROC UNLISTED  2010    excision of Lip lesion    MYRINGOTOMY, LASER-ASSISTED  2006    and tube placement    OTHER SURGICAL HISTORY      tonsillectomy    REPAIR OF NASAL SEPTUM  2005    Septoplasty    TONSILLECTOMY        Family History   Problem Relation Age of Onset    Allergies Other         family h/o    High Cholesterol Other         family h/o    Cancer Other         family h/o    Diabetes Paternal Grandfather     Prostate Cancer Paternal Grandfather     Breast Cancer Paternal Grandmother       Social History    Socioeconomic History      Marital status: Single      Number of children: 0    Occupational History      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 EXAM:   There were no vitals taken for this visit. Constitutional:   Patient in no apparent distress. Well kept. Of normal body habitus. Alert and oriented to person, place, and time. Vascular Examination:  DP pulse is 2/4  PT pulse is 2/4  Capillary refill is immediate  Integumentary Examination:   The patient's nails appear thickened, elongated, dystrophic, discolored with subungual debris 1-5 right, 1-5  left nails. Digital hair growth is present left and is present right. Skin is of good turgor  Neurological Examination:  Monofilament (10-g) sensation is 5/5 to right and 5/5 to left. Sharp/dull is present to right and is present to left. Parasthesias absent. Musculoskeletal Examination:  Muscle Strength is 5/5. LABS & IMAGING:     Lab Results   Component Value Date     (H) 03/27/2023    BUN 14 03/27/2023    CREATSERUM 0.87 03/27/2023    BUNCREA NOT APPLICABLE 95/85/8460    ANIONGAP 4 11/12/2022    GFRAA 133 05/31/2022    GFRNAA 115 05/31/2022    CA 9.0 03/27/2023     03/27/2023    K 4.1 03/27/2023     03/27/2023    CO2 28 03/27/2023    OSMOCALC 298 (H) 11/12/2022        Lab Results   Component Value Date     11/11/2022    A1C 6.0 (H) 03/27/2023        No results found.      ASSESSMENT AND PLAN:   Diagnoses and all orders for this visit:    Discoloration and thickening of nails both feet    Type 1 diabetes mellitus with hyperglycemia ([facility] [address]        Plan:     Diabetic education and instructions have been provided. We reviewed and discussed the following:    -risk categories related to pts with diabetes and foot or lower extremity complications per ADA. -adherence to medication regimen and close monitoring or blood sugar control.   -daily monitoring/inspection of feet and shoes.   -proper management of diet and weight   -regular follow up with PCP and specialty providers as recommended   -Lower extremity complications related to DM were reviewed and stressed prevention. Reviewed treatment options for nail dystrophy / onychomycosis including:   No treatment and monitoring, topical meds, oral meds, nail removal  Advantages and disadvantages of each reviewed. Using oral agents would require regular blood test monitoring due to risk of hepatotoxicity and other adverse reactions including drug interactions. Topical meds are much safer yet carry very low efficacy. CMP March of 2023 revealed normal AST/ALT    Pt would like to proceed with nail biopsy  Procedure: Nail Biopsy 73306  Using nail forceps a portion of the left hallux toenail was excised and sent to pathology for PAS stain. Pt tolerated the procedure well without complication. If nail biopsy is positive for fungus pt would like to proceed with oral lamisil x 90 days. Will call patient with nail biopsy results and if positive for fungus will prescribe oral lamisil. No follow-ups on file.     Kaden Luz DPM  8/16/2023

## 2023-08-18 ENCOUNTER — TELEPHONE (OUTPATIENT)
Dept: PODIATRY CLINIC | Facility: CLINIC | Age: 33
End: 2023-08-18

## 2023-08-18 RX ORDER — TERBINAFINE HYDROCHLORIDE 250 MG/1
250 TABLET ORAL DAILY
Qty: 90 TABLET | Refills: 0 | Status: SHIPPED | OUTPATIENT
Start: 2023-08-18 | End: 2023-11-16

## 2023-08-18 NOTE — TELEPHONE ENCOUNTER
Please inform patient that his nail biopsy was positive for fungus. I did prescribe oral terbinafine for 90 days to his pharmacy. He is to take as prescribed. Please have him make f/u appt in 3 months for re evaluation.

## 2023-09-12 ENCOUNTER — PATIENT MESSAGE (OUTPATIENT)
Dept: FAMILY MEDICINE CLINIC | Facility: CLINIC | Age: 33
End: 2023-09-12

## 2023-09-14 ENCOUNTER — TELEPHONE (OUTPATIENT)
Dept: FAMILY MEDICINE CLINIC | Facility: CLINIC | Age: 33
End: 2023-09-14

## 2023-09-14 ENCOUNTER — OFFICE VISIT (OUTPATIENT)
Dept: FAMILY MEDICINE CLINIC | Facility: CLINIC | Age: 33
End: 2023-09-14

## 2023-09-14 VITALS
HEART RATE: 76 BPM | BODY MASS INDEX: 37.2 KG/M2 | SYSTOLIC BLOOD PRESSURE: 119 MMHG | HEIGHT: 67 IN | DIASTOLIC BLOOD PRESSURE: 77 MMHG | WEIGHT: 237 LBS

## 2023-09-14 DIAGNOSIS — E78.2 MIXED HYPERLIPIDEMIA: ICD-10-CM

## 2023-09-14 DIAGNOSIS — E10.9 TYPE 1 DIABETES MELLITUS WITHOUT COMPLICATION (HCC): Primary | ICD-10-CM

## 2023-09-14 PROBLEM — E10.65 TYPE 1 DIABETES MELLITUS WITH HYPERGLYCEMIA (HCC): Status: RESOLVED | Noted: 2022-11-11 | Resolved: 2023-09-14

## 2023-09-14 PROCEDURE — 3074F SYST BP LT 130 MM HG: CPT | Performed by: PHYSICIAN ASSISTANT

## 2023-09-14 PROCEDURE — 3078F DIAST BP <80 MM HG: CPT | Performed by: PHYSICIAN ASSISTANT

## 2023-09-14 PROCEDURE — 99213 OFFICE O/P EST LOW 20 MIN: CPT | Performed by: PHYSICIAN ASSISTANT

## 2023-09-14 PROCEDURE — 90686 IIV4 VACC NO PRSV 0.5 ML IM: CPT | Performed by: PHYSICIAN ASSISTANT

## 2023-09-14 PROCEDURE — 3008F BODY MASS INDEX DOCD: CPT | Performed by: PHYSICIAN ASSISTANT

## 2023-09-14 PROCEDURE — 90471 IMMUNIZATION ADMIN: CPT | Performed by: PHYSICIAN ASSISTANT

## 2023-09-14 RX ORDER — ATORVASTATIN CALCIUM 20 MG/1
20 TABLET, FILM COATED ORAL DAILY
COMMUNITY

## 2023-09-14 NOTE — TELEPHONE ENCOUNTER
Pt dropped FMLA form. HIPAA signed Fee collected.  Forms scanned to KANDY and originals sent to Abner

## 2023-09-18 NOTE — TELEPHONE ENCOUNTER
Pts fmla and received and logged for processing. HIPAA received is incomplete and scanned into pts chart.  Sent [facility] for [facility]

## 2023-09-28 NOTE — TELEPHONE ENCOUNTER
Type of Leave:  Intermittent  Reason for Leave: Diabetic  Start date of leave: 09/28/23   How much time needed?: flare up 1-3 times a month 1 whole day  Forms Due Date: Whenever  Was Fee and Turnaround info Given?: Yes

## 2023-09-28 NOTE — TELEPHONE ENCOUNTER
Marek Fernandez     Patient is requesting for intermittent leave starting 09/28/23 due to diabetes, patient is requesting 1-3 flare up per month flare ups lasting 24 hours. Do you support?        Thank you    Lazara

## 2023-09-29 NOTE — TELEPHONE ENCOUNTER
Only as needed.  If patient is off work more often ( more than 2 times/ month), form needs to fill out by his Endocrinologist.

## 2023-09-29 NOTE — TELEPHONE ENCOUNTER
Patient is requesting for a reduced schedule and flare ups per my chart message, called patient LMTCB for more details and to explain to him NP message regarding flare ups.

## 2023-10-05 NOTE — TELEPHONE ENCOUNTER
Spoke with pt. Pt advised of message below per ARY Fallon. Pt is also requesting to work 9 hrs per day Monday-Friday. Pt would like all request since to Dr. Mary Jane Cabello. Pt verbalized understanding.

## 2023-10-06 NOTE — TELEPHONE ENCOUNTER
Dr. Rudy Cosby     Patient is requesting for intermittent leaving starting 09/28/23 due to diabetes, patient is requesting 1-3 flare ups per month each flare up lasting 24 hours. Patient is also requesting to work 9 hrs per day Monday-Friday. Do you support?      Thank you,   Lazara

## 2023-10-24 NOTE — TELEPHONE ENCOUNTER
Dr. Nacho Dow      Patient is requesting for intermittent leaving starting 09/28/23 due to diabetes, patient is requesting 1-3 flare ups per month each flare up lasting 24 hours. Patient is also requesting to work 9 hrs per day Monday-Friday. Do you support?       Thank you,   Lazara

## 2023-10-26 NOTE — TELEPHONE ENCOUNTER
Please try to avoid signing forms in the corner as it is not visible when printing and forms are not accepted this way. Thank you! Dr. Schroeder Running     *The ACKNOWLEDGE button has been moved to the top right ribbon*    Please sign off on form if you agree to: 2 forms need signature FMLA and Disability as discussed below  (place your signature on the first page only)    -From your Inbasket, Highlight the patient and click Chart   -Double click the 37/27/6224 Forms Completion telephone encounter  -Scroll down to the Media section   -Click the blue Hyperlink: FMLA Dr Schroeder Running 10/26/23and Disability Dr Schroeder Running 48/62/84   -Click Acknowledge located in the top right ribbon/menu   -Drag the mouse into the blank space of the document and a + sign will appear. Left click to   electronically sign the document.      Thank you,      Lazara

## 2023-11-08 ENCOUNTER — OFFICE VISIT (OUTPATIENT)
Dept: PODIATRY CLINIC | Facility: CLINIC | Age: 33
End: 2023-11-08
Payer: COMMERCIAL

## 2023-11-08 DIAGNOSIS — B35.1 ONYCHOMYCOSIS: Primary | ICD-10-CM

## 2023-11-08 DIAGNOSIS — E10.65 TYPE 1 DIABETES MELLITUS WITH HYPERGLYCEMIA (HCC): ICD-10-CM

## 2023-11-08 PROCEDURE — 99213 OFFICE O/P EST LOW 20 MIN: CPT | Performed by: PODIATRIST

## 2023-11-08 NOTE — PROGRESS NOTES
[address]  Progress Note    Kris Torres is a 29year old male. Chief Complaint   Patient presents with    Toenail Fungus     Bilateral feet f/u - he is on medication and he thinks they lock a little better - this AM his TV=598         HPI:     This is a pleasant type 1 diabetic male. He presents to clinic today for fungal toenail check. He has 1 more week of the oral lamisil and notes improvement. Allergies: Patient has no known allergies. Current Outpatient Medications   Medication Sig Dispense Refill    atorvastatin 20 MG Oral Tab Take 1 tablet (20 mg total) by mouth daily. terbinafine 250 MG Oral Tab Take 1 tablet (250 mg total) by mouth daily.  90 tablet 0    HUMALOG 100 UNIT/ML Subcutaneous Solution INJECT 100 UNITS DAILY VIA THE INSULIN PUMP SUBCUTANEOUS      BD INSULIN SYRINGE U/F 30G X 1/2\" 0.3 ML Does not apply Misc       BD PEN NEEDLE SHORT U/F 31G X 8 MM Does not apply Misc       ONETOUCH ULTRA BLUE In Vitro Strip         Past Medical History:   Diagnosis Date    Fracture of tibia     Learning disability     mild    Type I (juvenile type) diabetes mellitus without mention of complication, not stated as uncontrolled     Diagnosed 2011    Varicella 1995      Past Surgical History:   Procedure Laterality Date    ADENOIDECTOMY      LIP SURGERY PROC UNLISTED  2010    excision of Lip lesion    MYRINGOTOMY, LASER-ASSISTED  2006    and tube placement    OTHER SURGICAL HISTORY      tonsillectomy    REPAIR OF NASAL SEPTUM  2005    Septoplasty    TONSILLECTOMY        Family History   Problem Relation Age of Onset    Allergies Other         family h/o    High Cholesterol Other         family h/o    Cancer Other         family h/o    Diabetes Paternal Grandfather     Prostate Cancer Paternal Grandfather     Breast Cancer Paternal Grandmother       Social History     Socioeconomic History    Marital status: Single    Number of children: 0   Occupational History Occupation: Laid off currently - Heating/AC, [employer]   Tobacco Use    Smoking status: Never    Smokeless tobacco: Never   Vaping Use    Vaping Use: Never used   Substance and Sexual Activity    Alcohol use: Yes     Comment: rarely    Drug use: No   Other Topics Concern    Caffeine Concern Yes     Comment: soda           REVIEW OF SYSTEMS:   Denies nausea, fever, chills  No calf pain  No other muscle or joint aches  Denies chest pain or shortness of breath. EXAM:   There were no vitals taken for this visit. Constitutional:   Patient in no apparent distress. Well kept. Of normal body habitus. Alert and oriented to person, place, and time. Vascular Examination:  DP pulse is 2/4  PT pulse is 2/4  Capillary refill is immediate  Integumentary Examination:   The patient's nails appear thickened, elongated, dystrophic, discolored with subungual debris 1-5 right, 1-5  left nails, improved with clearing noted at toenail bases  Digital hair growth is present left and is present right. Skin is of good turgor  Neurological Examination:  Monofilament (10-g) sensation is 5/5 to right and 5/5 to left. Sharp/dull is present to right and is present to left. Parasthesias absent. Musculoskeletal Examination:  Muscle Strength is 5/5. LABS & IMAGING:     Lab Results   Component Value Date     (H) 03/27/2023    BUN 14 03/27/2023    CREATSERUM 0.87 03/27/2023    BUNCREA NOT APPLICABLE 65/03/8143    ANIONGAP 4 11/12/2022    GFRAA 133 05/31/2022    GFRNAA 115 05/31/2022    CA 9.0 03/27/2023     03/27/2023    K 4.1 03/27/2023     03/27/2023    CO2 28 03/27/2023    OSMOCALC 298 (H) 11/12/2022        Lab Results   Component Value Date     11/11/2022    A1C 6.0 (H) 03/27/2023        No results found.      ASSESSMENT AND PLAN:   Diagnoses and all orders for this visit:    Onychomycosis    Type 1 diabetes mellitus with hyperglycemia ([facility] [address]          Plan:     Reviewed treatment options for nail dystrophy / onychomycosis including:   No treatment and monitoring, topical meds, oral meds, nail removal  Advantages and disadvantages of each reviewed. Using oral agents would require regular blood test monitoring due to risk of hepatotoxicity and other adverse reactions including drug interactions. Topical meds are much safer yet carry very low efficacy. CMP March of 2023 revealed normal AST/ALT    Toenail biopsy on 8/16/23 was positive for fungus    Pt to cont oral lamisil he has 1 more week left    RTC 6 months for toenail fungus check      No follow-ups on file.     Gabriela Eduardo, WENDY  11/8/23

## 2023-11-09 ENCOUNTER — OFFICE VISIT (OUTPATIENT)
Dept: OPHTHALMOLOGY | Facility: CLINIC | Age: 33
End: 2023-11-09
Payer: COMMERCIAL

## 2023-11-09 DIAGNOSIS — E10.9 TYPE 1 DIABETES MELLITUS WITHOUT COMPLICATION (HCC): Primary | ICD-10-CM

## 2023-11-09 DIAGNOSIS — H25.013 CORTICAL AGE-RELATED CATARACT OF BOTH EYES: ICD-10-CM

## 2023-11-09 PROCEDURE — 3072F LOW RISK FOR RETINOPATHY: CPT | Performed by: OPHTHALMOLOGY

## 2023-11-09 PROCEDURE — 2023F DILAT RTA XM W/O RTNOPTHY: CPT | Performed by: OPHTHALMOLOGY

## 2023-11-09 PROCEDURE — 92004 COMPRE OPH EXAM NEW PT 1/>: CPT | Performed by: OPHTHALMOLOGY

## 2023-11-09 NOTE — PROGRESS NOTES
Kassie Whitt is a 29year old male. HPI:     HPI    NP is here for diabetic eye exam.  Pt was last seen by Optometrist about 6 months ago. Pt did not bring his distance glasses with him. He uses them mostly for night driving. Pt denies any eye trouble or vision changes. Pt has been a diabetic for 14 years       Pt's diabetes is currently controlled by insulin  Pt checks BS every day   Pt's last blood sugar was  108 this morning  Last HA1C was 6.0 on 3/27/23  Endocrinologist: Laura Avalos           Last edited by Adriana Cano O.TNimesh on 11/9/2023 10:14 AM.        Patient History:  Past Medical History:   Diagnosis Date    Fracture of tibia     Learning disability     mild    Type I (juvenile type) diabetes mellitus without mention of complication, not stated as uncontrolled     Diagnosed 2011    Varicella 1995       Surgical History: Kassie Whitt has a past surgical history that includes repair of nasal septum (2005) (Septoplasty); myringotomy, laser-assisted (2006) (and tube placement); tonsillectomy; adenoidectomy; lip surgery proc unlisted (2010) (excision of Lip lesion); and other surgical history (tonsillectomy).     Family History   Problem Relation Age of Onset    Allergies Other         family h/o    High Cholesterol Other         family h/o    Cancer Other         family h/o    Diabetes Paternal Grandfather     Prostate Cancer Paternal Grandfather     Breast Cancer Paternal Grandmother        Social History:   Social History     Socioeconomic History    Marital status: Single    Number of children: 0   Occupational History    Occupation: Laid off currently - Heating/AC, Landscaping   Tobacco Use    Smoking status: Never    Smokeless tobacco: Never   Vaping Use    Vaping Use: Never used   Substance and Sexual Activity    Alcohol use: Yes     Comment: rarely    Drug use: No   Other Topics Concern    Caffeine Concern Yes     Comment: soda       Medications:  Current Outpatient Medications   Medication Sig Dispense Refill    atorvastatin 20 MG Oral Tab Take 1 tablet (20 mg total) by mouth daily. terbinafine 250 MG Oral Tab Take 1 tablet (250 mg total) by mouth daily.  90 tablet 0    HUMALOG 100 UNIT/ML Subcutaneous Solution INJECT 100 UNITS DAILY VIA THE INSULIN PUMP SUBCUTANEOUS      BD INSULIN SYRINGE U/F 30G X 1/2\" 0.3 ML Does not apply Misc       BD PEN NEEDLE SHORT U/F 31G X 8 MM Does not apply Misc       ONETOUCH ULTRA BLUE In Vitro Strip          Allergies:  No Known Allergies    ROS:       PHYSICAL EXAM:     Base Eye Exam       Visual Acuity (Snellen - Linear)         Right Left    Dist sc 20/40 -2 20/20 -1    Near sc 20/20 20/20              Tonometry (Icare, 10:17 AM)         Right Left    Pressure 12 10              Pupils         Pupils    Right PERRL    Left PERRL              Visual Fields         Left Right     Full Full              Extraocular Movement         Right Left     Full, Ortho Full, Ortho              Neuro/Psych       Oriented x3: Yes    Mood/Affect: Normal              Dilation       Both eyes: 1.0% Mydriacyl and 2.5% Tripp Synephrine @ 10:17 AM                  Slit Lamp and Fundus Exam       Slit Lamp Exam         Right Left    Lids/Lashes Meibomian gland dysfunction Meibomian gland dysfunction    Conjunctiva/Sclera Normal Normal    Cornea Clear Clear    Anterior Chamber Deep and quiet Deep and quiet    Iris Normal Normal    Lens 1+ anterior cortical, 1+ PSC 1+ anterior cortical, trace PSC    Vitreous Clear Clear              Fundus Exam         Right Left    Disc Good rim Good rim    C/D Ratio 0.3 0.3    Macula Normal-no BDR Normal-no BDR    Vessels Normal Normal    Periphery Normal Normal                  Refraction       Wearing Rx       Type: Forgot glasses              Manifest Refraction (Auto)         Sphere Cylinder Axis    Right +0.75 +1.75 095    Left +1.00 +0.25 100   Patient is happy with his glasses which are 7 months old ASSESSMENT/PLAN:     Diagnoses and Plan:     Type 1 diabetes mellitus without complication (HCC)  Diabetes type I: no background retinopathy, no signs of neovascularization noted. Discussed ocular and systemic benefits of blood sugar control. Will see patient in 1 year for a diabetic exam    Cortical age-related cataract of both eyes  Discussed early cataracts with patient. No treatment recommended at this time. Continue with same glasses    No orders of the defined types were placed in this encounter.       Meds This Visit:  Requested Prescriptions      No prescriptions requested or ordered in this encounter        Follow up instructions:  Return in about 1 year (around 11/9/2024) for Diabetic eye exam.    11/9/2023  Scribed by: Sabrian Oconnor MD

## 2023-11-09 NOTE — PATIENT INSTRUCTIONS
Type 1 diabetes mellitus without complication (HCC)  Diabetes type I: no background retinopathy, no signs of neovascularization noted. Discussed ocular and systemic benefits of blood sugar control. Will see patient in 1 year for a diabetic exam    Cortical age-related cataract of both eyes  Discussed early cataracts with patient. No treatment recommended at this time.      Continue with same glasses

## 2023-11-10 NOTE — TELEPHONE ENCOUNTER
Please try to avoid signing forms in the corner as it is not visible when printing and forms are not accepted this way. Thank you! Dr. Lucio Carreno     *The ACKNOWLEDGE button has been moved to the top right ribbon*     Please sign off on form if you agree to: 2 forms need signature FMLA and Disability as discussed below  (place your signature on the first page only)     -From your Inbasket, Highlight the patient and click Chart   -Double click the [id number] Forms Completion telephone encounter  -Scroll down to the Media section   -Click the blue Hyperlink: FMLA Dr Lucio Carreno 10/26/23and Disability Dr Lucio Carreno 09/91/22   -Click Acknowledge located in the top right ribbon/menu   -Drag the mouse into the blank space of the document and a + sign will appear. Left click to   electronically sign the document.      Thank you,        Lazara

## 2023-11-13 NOTE — TELEPHONE ENCOUNTER
Forms completed and Faxed to [facility] at [phone] and [employer] at [phone].  MyChart sent to patient confirmation received

## 2024-02-13 LAB
ALBUMIN/GLOBULIN RATIO: 1.4 (CALC) (ref 1–2.5)
ALBUMIN: 4 G/DL (ref 3.6–5.1)
ALKALINE PHOSPHATASE: 67 U/L (ref 36–130)
ALT: 39 U/L (ref 9–46)
AST: 41 U/L (ref 10–40)
BILIRUBIN, TOTAL: 0.4 MG/DL (ref 0.2–1.2)
BUN: 17 MG/DL (ref 7–25)
CALCIUM: 8.9 MG/DL (ref 8.6–10.3)
CARBON DIOXIDE: 28 MMOL/L (ref 20–32)
CHLORIDE: 102 MMOL/L (ref 98–110)
CHOL/HDLC RATIO: 2.7 (CALC)
CHOLESTEROL, TOTAL: 138 MG/DL
CREATININE: 0.89 MG/DL (ref 0.6–1.26)
EGFR: 117 ML/MIN/1.73M2
GLOBULIN: 2.8 G/DL (CALC) (ref 1.9–3.7)
GLUCOSE: 179 MG/DL (ref 65–99)
HDL CHOLESTEROL: 51 MG/DL
HEMOGLOBIN A1C: 6.8 % OF TOTAL HGB
LDL-CHOLESTEROL: 66 MG/DL (CALC)
NON-HDL CHOLESTEROL: 87 MG/DL (CALC)
POTASSIUM: 4.4 MMOL/L (ref 3.5–5.3)
PROTEIN, TOTAL: 6.8 G/DL (ref 6.1–8.1)
SODIUM: 139 MMOL/L (ref 135–146)
TRIGLYCERIDES: 119 MG/DL

## 2024-03-22 NOTE — PLAN OF CARE
Received from ED, alert & Ox4. Insulin pump and CGM intact, reviewed by Dr. Sonja Oconnor in ED. Continuing to monitor the efficiency of the insulin pump. In ED received 10 units Novolog. On admit accucheck was normal. By time food arrived, patients CGM alerted him that blood glucose was 48, our accucheck confirmed blood glucose of 48, treated with glucose liquid as ordered. Dr. Sonja Oconnor notified and insulin pens discontinued per order. Goal is to monitor the pump and adjust accordingly so pump can continue to be used at home. Tolerating meals. Will continue to monitor. Problem: Patient Centered Care  Goal: Patient preferences are identified and integrated in the patient's plan of care  Description: Interventions:  - What would you like us to know as we care for you?  \"I have my insulin pump and CGM on\"  - Provide timely, complete, and accurate information to patient/family  - Incorporate patient and family knowledge, values, beliefs, and cultural backgrounds into the planning and delivery of care  - Encourage patient/family to participate in care and decision-making at the level they choose  - Honor patient and family perspectives and choices  Outcome: Progressing     Problem: Patient/Family Goals  Goal: Patient/Family Long Term Goal  Description: Patient's Long Term Goal: return home    Interventions:  - insulin pump monitoring  - See additional Care Plan goals for specific interventions  Outcome: Not Progressing  Goal: Patient/Family Short Term Goal  Description: Patient's Short Term Goal: maintain normal blood sugar levels    Interventions:   - accucheck ACHS  - See additional Care Plan goals for specific interventions  Outcome: Not Progressing     Problem: Diabetes/Glucose Control  Goal: Glucose maintained within prescribed range  Description: INTERVENTIONS:  - Monitor Blood Glucose as ordered  - Assess for signs and symptoms of hyperglycemia and hypoglycemia  - Administer ordered medications to maintain glucose Signed pended referral   Chronic issue- note sure why it was sent as high priority - this is not urgent or emergent     The reason I say this is bc we get a lot of msg and when sent as high priority, we try to get to those first usually in between seeing pts in a busy clinic so it difficult to filter which msg to do first if they are for f/u questions or simple referrals    within target range  - Assess barriers to adequate nutritional intake and initiate nutrition consult as needed  - Instruct patient on self management of diabetes  Outcome: Not Progressing

## 2024-05-08 ENCOUNTER — OFFICE VISIT (OUTPATIENT)
Dept: PODIATRY CLINIC | Facility: CLINIC | Age: 34
End: 2024-05-08
Payer: COMMERCIAL

## 2024-05-08 DIAGNOSIS — B35.1 ONYCHOMYCOSIS: Primary | ICD-10-CM

## 2024-05-08 DIAGNOSIS — E10.65 TYPE 1 DIABETES MELLITUS WITH HYPERGLYCEMIA (HCC): ICD-10-CM

## 2024-05-08 PROCEDURE — 99213 OFFICE O/P EST LOW 20 MIN: CPT | Performed by: PODIATRIST

## 2024-05-08 NOTE — PROGRESS NOTES
[facility] Podiatry  Progress Note    Reymundo Pitts is a 33 year old male.   Chief Complaint   Patient presents with    Toenail Fungus     B/l foot f/u - Pt states medication helped but fungus still there. No pain  FBS- 106- A1C:6.8 on 2/12         HPI:     This is a pleasant type 1 diabetic male.      He presents to clinic today for fungal toenail check.  He did finish the oral lamisil in December of 2023.  He has noticed improvement.        Allergies: Patient has no known allergies.   Current Outpatient Medications   Medication Sig Dispense Refill    atorvastatin 20 MG Oral Tab Take 1 tablet (20 mg total) by mouth daily.      HUMALOG 100 UNIT/ML Subcutaneous Solution INJECT 100 UNITS DAILY VIA THE INSULIN PUMP SUBCUTANEOUS      BD INSULIN SYRINGE U/F 30G X 1/2\" 0.3 ML Does not apply Misc       BD PEN NEEDLE SHORT U/F 31G X 8 MM Does not apply Misc       ONETOUCH ULTRA BLUE In Vitro Strip         Past Medical History:    Fracture of tibia    Learning disability    mild    Type I (juvenile type) diabetes mellitus without mention of complication, not stated as uncontrolled    Diagnosed 2011    Varicella      Past Surgical History:   Procedure Laterality Date    Adenoidectomy      Lip surgery proc unlisted  2010    excision of Lip lesion    Myringotomy, laser-assisted  2006    and tube placement    Other surgical history      tonsillectomy    Repair of nasal septum  2005    Septoplasty    Tonsillectomy        Family History   Problem Relation Age of Onset    Allergies Other         family h/o    High Cholesterol Other         family h/o    Cancer Other         family h/o    Diabetes Paternal Grandfather     Prostate Cancer Paternal Grandfather     Breast Cancer Paternal Grandmother       Social History     Socioeconomic History    Marital status: Single    Number of children: 0   Occupational History    Occupation: Laid off currently - Heating/AC, Landscaping   Tobacco Use    Smoking status: Never     Smokeless tobacco: Never   Vaping Use    Vaping status: Never Used   Substance and Sexual Activity    Alcohol use: Yes     Comment: rarely    Drug use: No   Other Topics Concern    Caffeine Concern Yes     Comment: soda           REVIEW OF SYSTEMS:   Denies nausea, fever, chills  No calf pain  No other muscle or joint aches  Denies chest pain or shortness of breath.      EXAM:   There were no vitals taken for this visit.    Constitutional:   Patient in no apparent distress. Well kept. Of normal body habitus. Alert and oriented to person, place, and time.  Vascular Examination:  DP pulse is 2/4  PT pulse is 2/4  Capillary refill is immediate  Integumentary Examination:   The patient's nails appear thickened, elongated, dystrophic, discolored with subungual debris 1-5 right, 1-5  left nails, improved with increased clearing noted  Digital hair growth is present left and is present right.  Skin is of good turgor  Neurological Examination:  Monofilament (10-g) sensation is 5/5 to right and 5/5 to left.  Sharp/dull is present to right and is present to left.  Parasthesias absent.  Musculoskeletal Examination:  Muscle Strength is 5/5.      LABS & IMAGING:     Lab Results   Component Value Date     (H) 02/12/2024    BUN 17 02/12/2024    CREATSERUM 0.89 02/12/2024    BUNCREA SEE NOTE: 02/12/2024    ANIONGAP 4 11/12/2022    GFRAA 133 05/31/2022    GFRNAA 115 05/31/2022    CA 8.9 02/12/2024     02/12/2024    K 4.4 02/12/2024     02/12/2024    CO2 28 02/12/2024    OSMOCALC 298 (H) 11/12/2022        Lab Results   Component Value Date     11/11/2022    A1C 6.8 (H) 02/12/2024        No results found.     ASSESSMENT AND PLAN:   Diagnoses and all orders for this visit:    Onychomycosis    Type 1 diabetes mellitus with hyperglycemia (HCC)            Plan:     Reviewed treatment options for nail dystrophy / onychomycosis including:   No treatment and monitoring, topical meds, oral meds, nail  removal  Advantages and disadvantages of each reviewed. Using oral agents would require regular blood test monitoring due to risk of hepatotoxicity and other adverse reactions including drug interactions.   Topical meds are much safer yet carry very low efficacy.    CMP March of 2023 revealed normal AST/ALT    Toenail biopsy on 8/16/23 was positive for fungus    Pt did finish 90 days course of oral lamisil in December of 2023    RTC 4-6 months for toenail fungus check.  If fungus is still evident will consider 2nd round of oral lamisil.        No follow-ups on file.    Charli Waetrs, WENDY  5/8/24

## 2024-08-12 ENCOUNTER — TELEPHONE (OUTPATIENT)
Dept: PODIATRY CLINIC | Facility: CLINIC | Age: 34
End: 2024-08-12

## 2024-08-12 NOTE — TELEPHONE ENCOUNTER
Pharmacy refill request:      Terbinafine  mg Tablet  Take 1 tablet by mouth every day.   QTY: 90    **Pharmacy Comments - Patient expects to  prescription on 08/13/2024.

## 2024-10-22 ENCOUNTER — OFFICE VISIT (OUTPATIENT)
Dept: FAMILY MEDICINE CLINIC | Facility: CLINIC | Age: 34
End: 2024-10-22
Payer: COMMERCIAL

## 2024-10-22 VITALS
WEIGHT: 243 LBS | DIASTOLIC BLOOD PRESSURE: 67 MMHG | SYSTOLIC BLOOD PRESSURE: 112 MMHG | HEART RATE: 82 BPM | BODY MASS INDEX: 38.14 KG/M2 | HEIGHT: 67 IN

## 2024-10-22 DIAGNOSIS — L98.9 SKIN LESION OF SCALP: ICD-10-CM

## 2024-10-22 DIAGNOSIS — L02.91 ABSCESS: Primary | ICD-10-CM

## 2024-10-22 PROCEDURE — 99213 OFFICE O/P EST LOW 20 MIN: CPT | Performed by: PHYSICIAN ASSISTANT

## 2024-10-22 PROCEDURE — 90656 IIV3 VACC NO PRSV 0.5 ML IM: CPT | Performed by: PHYSICIAN ASSISTANT

## 2024-10-22 PROCEDURE — 90471 IMMUNIZATION ADMIN: CPT | Performed by: PHYSICIAN ASSISTANT

## 2024-10-22 RX ORDER — CEPHALEXIN 500 MG/1
500 CAPSULE ORAL 3 TIMES DAILY
Qty: 21 CAPSULE | Refills: 0 | Status: SHIPPED | OUTPATIENT
Start: 2024-10-22 | End: 2024-10-29

## 2024-10-22 RX ORDER — MUPIROCIN 20 MG/G
OINTMENT TOPICAL
Qty: 30 G | Refills: 0 | Status: SHIPPED | OUTPATIENT
Start: 2024-10-22

## 2024-10-22 NOTE — PROGRESS NOTES
HPI:     HPI  A 34-year-old male is in the office complaining of a lump on his right intergluteal cleft for the past month. The lump is painful and draining. The lump is getting smaller.  There are 2 hard lesions on the scalp. No tenderness. No erythema.    Medications:     Current Outpatient Medications   Medication Sig Dispense Refill    mupirocin 2 % External Ointment Apply to affect lesions tid 30 g 0    cephALEXin 500 MG Oral Cap Take 1 capsule (500 mg total) by mouth 3 (three) times daily for 7 days. 21 capsule 0    atorvastatin 20 MG Oral Tab Take 1 tablet (20 mg total) by mouth daily.      HUMALOG 100 UNIT/ML Subcutaneous Solution INJECT 100 UNITS DAILY VIA THE INSULIN PUMP SUBCUTANEOUS      BD INSULIN SYRINGE U/F 30G X 1/2\" 0.3 ML Does not apply Misc       BD PEN NEEDLE SHORT U/F 31G X 8 MM Does not apply Misc       ONETOUCH ULTRA BLUE In Vitro Strip          Allergies:   Allergies[1]    History:     Health Maintenance   Topic Date Due    Annual Depression Screening  01/01/2024    Diabetes Care: Microalb/Creat Ratio  03/28/2024    Annual Physical  04/05/2024    Diabetes Care A1C  08/12/2024    COVID-19 Vaccine (3 - 2023-24 season) 09/01/2024    Diabetes Care Foot Exam  09/14/2024    Influenza Vaccine (1) 10/01/2024    Diabetes Care Dilated Eye Exam  11/09/2024    Diabetes Care: GFR  02/12/2025    DTaP,Tdap,and Td Vaccines (8 - Td or Tdap) 05/18/2027    Pneumococcal Vaccine: Birth to 64yrs  Completed       No LMP for male patient.   Past Medical History:     Past Medical History:    Fracture of tibia    Learning disability    mild    Type I (juvenile type) diabetes mellitus without mention of complication, not stated as uncontrolled    Diagnosed 2011    Varicella       Past Surgical History:     Past Surgical History:   Procedure Laterality Date    Adenoidectomy      Lip surgery proc unlisted  2010    excision of Lip lesion    Myringotomy, laser-assisted  2006    and tube placement    Other surgical history       tonsillectomy    Repair of nasal septum  2005    Septoplasty    Tonsillectomy         Family History:     Family History   Problem Relation Age of Onset    Allergies Other         family h/o    High Cholesterol Other         family h/o    Cancer Other         family h/o    Diabetes Paternal Grandfather     Prostate Cancer Paternal Grandfather     Breast Cancer Paternal Grandmother        Social History:     Social History     Socioeconomic History    Marital status: Single     Spouse name: Not on file    Number of children: 0    Years of education: Not on file    Highest education level: Not on file   Occupational History    Occupation: Laid off currently - Heating/AC, [employer]   Tobacco Use    Smoking status: Never    Smokeless tobacco: Never   Vaping Use    Vaping status: Never Used   Substance and Sexual Activity    Alcohol use: Yes     Comment: rarely    Drug use: No    Sexual activity: Not on file   Other Topics Concern     Service Not Asked    Blood Transfusions Not Asked    Caffeine Concern Yes     Comment: soda    Occupational Exposure Not Asked    Hobby Hazards Not Asked    Sleep Concern Not Asked    Stress Concern Not Asked    Weight Concern Not Asked    Special Diet Not Asked    Back Care Not Asked    Exercise Not Asked    Bike Helmet Not Asked    Seat Belt Not Asked    Self-Exams Not Asked   Social History Narrative    Not on file     Social Drivers of Health     Financial Resource Strain: Not on file   Food Insecurity: Not on file   Transportation Needs: Not on file   Physical Activity: Not on file   Stress: Not on file   Social Connections: Not on file   Housing Stability: Not on file       Review of Systems:   Review of Systems   Skin:  Positive for wound.        Vitals:    10/22/24 1531   BP: 112/67   Pulse: 82   Weight: 243 lb (110.2 kg)   Height: 5' 7\" (1.702 m)     Body mass index is 38.06 kg/m².    Physical Exam:   Physical Exam  Vitals reviewed.   Skin:     Findings: Lesion  present.      There is a small abscess with small drainage. Mild tenderness.    Assessment and Plan::     Problem List Items Addressed This Visit    None  Visit Diagnoses       Abscess    -  Primary    Relevant Medications    mupirocin 2 % External Ointment    cephALEXin 500 MG Oral Cap    Skin lesion of scalp        Relevant Medications        Other Relevant Orders    Surgery Referral - In Network          Discussed plan of care with pt and pt is in agreement.All questions answered. Pt to call with questions or concerns.    F/U prn for physical and lab work.       [1] No Known Allergies

## 2024-11-18 ENCOUNTER — OFFICE VISIT (OUTPATIENT)
Dept: SURGERY | Facility: CLINIC | Age: 34
End: 2024-11-18
Payer: COMMERCIAL

## 2024-11-18 VITALS
DIASTOLIC BLOOD PRESSURE: 73 MMHG | BODY MASS INDEX: 37.67 KG/M2 | SYSTOLIC BLOOD PRESSURE: 120 MMHG | WEIGHT: 240 LBS | HEIGHT: 67 IN

## 2024-11-18 DIAGNOSIS — L98.8 PILONIDAL DISEASE: Primary | ICD-10-CM

## 2024-11-18 PROCEDURE — 99204 OFFICE O/P NEW MOD 45 MIN: CPT | Performed by: SURGERY

## 2024-11-18 NOTE — H&P
History and Physical      HPI     Chief Complaint   Patient presents with    Referral     Referral from Dr. Barker for possible rectal abscess.  Patient also reports two scalp cysts.         HPI  Reymundo Pitts is a 34 year old male who presents with acute on chronic drainage from pilonidal disease.  Typical location in the anococcygeal cleft with 2 draining fistulous.  In addition he has 2 pilar cyst he like excised of the scalp 1 measuring 3 cm the other 2 cm    Past Medical History:    Fracture of tibia    Learning disability    mild    Type I (juvenile type) diabetes mellitus without mention of complication, not stated as uncontrolled    Diagnosed 2011    Varicella     Past Surgical History:   Procedure Laterality Date    Adenoidectomy      Lip surgery proc unlisted  2010    excision of Lip lesion    Myringotomy, laser-assisted  2006    and tube placement    Other surgical history      tonsillectomy    Repair of nasal septum  2005    Septoplasty    Tonsillectomy       Current Outpatient Medications   Medication Sig Dispense Refill    mupirocin 2 % External Ointment Apply to affect lesions tid 30 g 0    atorvastatin 20 MG Oral Tab Take 1 tablet (20 mg total) by mouth daily.      HUMALOG 100 UNIT/ML Subcutaneous Solution INJECT 100 UNITS DAILY VIA THE INSULIN PUMP SUBCUTANEOUS      BD INSULIN SYRINGE U/F 30G X 1/2\" 0.3 ML Does not apply Misc       BD PEN NEEDLE SHORT U/F 31G X 8 MM Does not apply Misc       ONETOUCH ULTRA BLUE In Vitro Strip        ALLERGIES  Allergies[1]    Social History     Socioeconomic History    Marital status: Single    Number of children: 0   Occupational History    Occupation: Laid off currently - Heating/AC, [employer]   Tobacco Use    Smoking status: Never    Smokeless tobacco: Never   Vaping Use    Vaping status: Never Used   Substance and Sexual Activity    Alcohol use: Yes     Comment: rarely    Drug use: No     Family History   Problem Relation Age of Onset    Allergies  Other         family h/o    High Cholesterol Other         family h/o    Cancer Other         family h/o    Diabetes Paternal Grandfather     Prostate Cancer Paternal Grandfather     Breast Cancer Paternal Grandmother        Review of Systems   A comprehensive 10 point review of systems was completed.  Pertinent positives and negatives noted in the the HPI.    PHYSICAL EXAM   /73 (BP Location: Left arm)   Ht 5' 7\" (1.702 m)   Wt 240 lb (108.9 kg)   BMI 37.59 kg/m²  No LMP for male patient.   Constitutional: appears well hydrated alert and responsive no acute distress noted  Head/Face: normocephalic  Pilar cyst x 2  Nose/Mouth/Throat: nose and throat are clear palate is intact mucous membranes are moist no oral lesions are noted  Neck/Thyroid: neck is supple without adenopathy  Respiratory: normal to inspection lungs are clear to auscultation bilaterally normal respiratory effort  Cardiovascular: regular rate and rhythm no murmurs, gallups, or rubs  Abdomen: soft non-tender non-distended no organomegaly noted no masses      Chronic pilonidal disease with 2 draining fistula tracts    Extremities: no edema, cyanosis, or clubbing  Neurological: exam appropriate for age reflexes and motor skills appropriate for age      ASSESSMENT/PLAN   Assessment   Encounter Diagnosis   Name Primary?    Pilonidal disease Yes       34 year old male with pilonidal disease, 2 pilar cysts  We have discussed the surgical risks, benefits, alternatives, and expected recovery. We will plan wide excision pilonidal disease with flap closure, excision 2 pilar scalp cysts. All of the patient's questions have been answered to his satisfaction.       11/18/2024  Juan M Rosenberg MD               [1] No Known Allergies

## 2024-11-18 NOTE — H&P (VIEW-ONLY)
History and Physical      HPI     Chief Complaint   Patient presents with    Referral     Referral from Dr. Barker for possible rectal abscess.  Patient also reports two scalp cysts.         HPI  Reymundo Pitts is a 34 year old male who presents with acute on chronic drainage from pilonidal disease.  Typical location in the anococcygeal cleft with 2 draining fistulous.  In addition he has 2 pilar cyst he like excised of the scalp 1 measuring 3 cm the other 2 cm    Past Medical History:    Fracture of tibia    Learning disability    mild    Type I (juvenile type) diabetes mellitus without mention of complication, not stated as uncontrolled    Diagnosed 2011    Varicella     Past Surgical History:   Procedure Laterality Date    Adenoidectomy      Lip surgery proc unlisted  2010    excision of Lip lesion    Myringotomy, laser-assisted  2006    and tube placement    Other surgical history      tonsillectomy    Repair of nasal septum  2005    Septoplasty    Tonsillectomy       Current Outpatient Medications   Medication Sig Dispense Refill    mupirocin 2 % External Ointment Apply to affect lesions tid 30 g 0    atorvastatin 20 MG Oral Tab Take 1 tablet (20 mg total) by mouth daily.      HUMALOG 100 UNIT/ML Subcutaneous Solution INJECT 100 UNITS DAILY VIA THE INSULIN PUMP SUBCUTANEOUS      BD INSULIN SYRINGE U/F 30G X 1/2\" 0.3 ML Does not apply Misc       BD PEN NEEDLE SHORT U/F 31G X 8 MM Does not apply Misc       ONETOUCH ULTRA BLUE In Vitro Strip        ALLERGIES  Allergies[1]    Social History     Socioeconomic History    Marital status: Single    Number of children: 0   Occupational History    Occupation: Laid off currently - Heating/AC, [employer]   Tobacco Use    Smoking status: Never    Smokeless tobacco: Never   Vaping Use    Vaping status: Never Used   Substance and Sexual Activity    Alcohol use: Yes     Comment: rarely    Drug use: No     Family History   Problem Relation Age of Onset    Allergies  Other         family h/o    High Cholesterol Other         family h/o    Cancer Other         family h/o    Diabetes Paternal Grandfather     Prostate Cancer Paternal Grandfather     Breast Cancer Paternal Grandmother        Review of Systems   A comprehensive 10 point review of systems was completed.  Pertinent positives and negatives noted in the the HPI.    PHYSICAL EXAM   /73 (BP Location: Left arm)   Ht 5' 7\" (1.702 m)   Wt 240 lb (108.9 kg)   BMI 37.59 kg/m²  No LMP for male patient.   Constitutional: appears well hydrated alert and responsive no acute distress noted  Head/Face: normocephalic  Pilar cyst x 2  Nose/Mouth/Throat: nose and throat are clear palate is intact mucous membranes are moist no oral lesions are noted  Neck/Thyroid: neck is supple without adenopathy  Respiratory: normal to inspection lungs are clear to auscultation bilaterally normal respiratory effort  Cardiovascular: regular rate and rhythm no murmurs, gallups, or rubs  Abdomen: soft non-tender non-distended no organomegaly noted no masses      Chronic pilonidal disease with 2 draining fistula tracts    Extremities: no edema, cyanosis, or clubbing  Neurological: exam appropriate for age reflexes and motor skills appropriate for age      ASSESSMENT/PLAN   Assessment   Encounter Diagnosis   Name Primary?    Pilonidal disease Yes       34 year old male with pilonidal disease, 2 pilar cysts  We have discussed the surgical risks, benefits, alternatives, and expected recovery. We will plan wide excision pilonidal disease with flap closure, excision 2 pilar scalp cysts. All of the patient's questions have been answered to his satisfaction.       11/18/2024  Juan M Rosenberg MD               [1] No Known Allergies

## 2024-11-19 ENCOUNTER — TELEPHONE (OUTPATIENT)
Dept: SURGERY | Facility: CLINIC | Age: 34
End: 2024-11-19

## 2024-11-19 NOTE — TELEPHONE ENCOUNTER
Patient called forms dept. Patient states he needs disability and Family Medical Leave Act  forms completed before his upcoming surgery 12/03/24. Start date 12/03/24-3wks. Patient wishes to email his forms. Patient was provided with our email. [email or web address].     Family Medical Leave Act and disability received in forms dept. Logged for processing. Patient was informed we will be sending [employer] message for completion of Release of Information. Patient will need to complete 2 Release of Information one for [employer] and the other for [employer]. Patient verbalized understanding.

## 2024-12-02 NOTE — TELEPHONE ENCOUNTER
Dr. Rosenberg                                                          2 FORMS    Please sign off on form if you agree to: Family Medical Leave Act for surgery Start:12/3/24 End:3wks and disability Start:12/3/24 End:12/24/24 Return to work:12/25/24    -Signature page will be the first page scanned  -From your Inbasket, Highlight the patient and click Chart   -Double click the 11/19/24 Forms Completion telephone encounter  -Scroll down to the Media section   -Click the blue Hyperlink: Family Medical Leave Act Dr. Rosenberg 12/2/24 and   disability Dr. Rosenberg 12/2/24  -Click Acknowledge located in the top right ribbon/menu   -Drag the mouse into the blank space of the document and a + sign will appear. Left click to   electronically sign the document.  -Once signed, simply exit out of the screen and you signature will be saved.     Thank you,  Juliana

## 2024-12-02 NOTE — TELEPHONE ENCOUNTER
Patient called for status. Patient requested to expedite. Made Rep aware. Received incomplete Release of Information authorization. Patient requested to up load to my chart when forms are completed.

## 2024-12-02 NOTE — TELEPHONE ENCOUNTER
Patient called for status, adv still being processed, adv patient that I would let processing agent for that day know that forms are needed asap as procedure is set for tomorrow 12/3/2024

## 2024-12-03 ENCOUNTER — ANESTHESIA (OUTPATIENT)
Dept: SURGERY | Facility: HOSPITAL | Age: 34
End: 2024-12-03
Payer: COMMERCIAL

## 2024-12-03 ENCOUNTER — HOSPITAL ENCOUNTER (OUTPATIENT)
Facility: HOSPITAL | Age: 34
Setting detail: HOSPITAL OUTPATIENT SURGERY
Discharge: HOME | End: 2024-12-03
Attending: SURGERY | Admitting: SURGERY
Payer: COMMERCIAL

## 2024-12-03 ENCOUNTER — ANESTHESIA EVENT (OUTPATIENT)
Dept: SURGERY | Facility: HOSPITAL | Age: 34
End: 2024-12-03
Payer: COMMERCIAL

## 2024-12-03 VITALS
BODY MASS INDEX: 33.88 KG/M2 | WEIGHT: 242 LBS | HEART RATE: 65 BPM | OXYGEN SATURATION: 100 % | TEMPERATURE: 98 F | RESPIRATION RATE: 16 BRPM | HEIGHT: 71 IN | SYSTOLIC BLOOD PRESSURE: 134 MMHG | DIASTOLIC BLOOD PRESSURE: 72 MMHG

## 2024-12-03 DIAGNOSIS — L98.8 PILONIDAL DISEASE: ICD-10-CM

## 2024-12-03 LAB
GLUCOSE BLDC GLUCOMTR-MCNC: 100 MG/DL (ref 70–99)
GLUCOSE BLDC GLUCOMTR-MCNC: 79 MG/DL (ref 70–99)

## 2024-12-03 PROCEDURE — 0JB90ZZ EXCISION OF BUTTOCK SUBCUTANEOUS TISSUE AND FASCIA, OPEN APPROACH: ICD-10-PCS | Performed by: SURGERY

## 2024-12-03 PROCEDURE — 82962 GLUCOSE BLOOD TEST: CPT

## 2024-12-03 PROCEDURE — 88305 TISSUE EXAM BY PATHOLOGIST: CPT | Performed by: SURGERY

## 2024-12-03 PROCEDURE — 0HX8XZZ TRANSFER BUTTOCK SKIN, EXTERNAL APPROACH: ICD-10-PCS | Performed by: SURGERY

## 2024-12-03 PROCEDURE — 0HB0XZZ EXCISION OF SCALP SKIN, EXTERNAL APPROACH: ICD-10-PCS | Performed by: SURGERY

## 2024-12-03 PROCEDURE — 88304 TISSUE EXAM BY PATHOLOGIST: CPT | Performed by: SURGERY

## 2024-12-03 RX ORDER — FAMOTIDINE 10 MG/ML
20 INJECTION, SOLUTION INTRAVENOUS ONCE
Status: COMPLETED | OUTPATIENT
Start: 2024-12-03 | End: 2024-12-03

## 2024-12-03 RX ORDER — ROCURONIUM BROMIDE 10 MG/ML
INJECTION, SOLUTION INTRAVENOUS AS NEEDED
Status: DISCONTINUED | OUTPATIENT
Start: 2024-12-03 | End: 2024-12-03 | Stop reason: SURG

## 2024-12-03 RX ORDER — IBUPROFEN 600 MG/1
600 TABLET, FILM COATED ORAL EVERY 6 HOURS PRN
Qty: 15 TABLET | Refills: 1 | Status: SHIPPED | OUTPATIENT
Start: 2024-12-03 | End: 2024-12-10

## 2024-12-03 RX ORDER — NICOTINE POLACRILEX 4 MG
30 LOZENGE BUCCAL
OUTPATIENT
Start: 2024-12-03

## 2024-12-03 RX ORDER — NALOXONE HYDROCHLORIDE 0.4 MG/ML
0.08 INJECTION, SOLUTION INTRAMUSCULAR; INTRAVENOUS; SUBCUTANEOUS AS NEEDED
OUTPATIENT
Start: 2024-12-03 | End: 2024-12-03

## 2024-12-03 RX ORDER — SODIUM CHLORIDE, SODIUM LACTATE, POTASSIUM CHLORIDE, CALCIUM CHLORIDE 600; 310; 30; 20 MG/100ML; MG/100ML; MG/100ML; MG/100ML
INJECTION, SOLUTION INTRAVENOUS CONTINUOUS
OUTPATIENT
Start: 2024-12-03

## 2024-12-03 RX ORDER — MIDAZOLAM HYDROCHLORIDE 1 MG/ML
INJECTION INTRAMUSCULAR; INTRAVENOUS AS NEEDED
Status: DISCONTINUED | OUTPATIENT
Start: 2024-12-03 | End: 2024-12-03 | Stop reason: SURG

## 2024-12-03 RX ORDER — DOCUSATE SODIUM 100 MG/1
100 CAPSULE, LIQUID FILLED ORAL 2 TIMES DAILY
Qty: 30 CAPSULE | Refills: 0 | Status: SHIPPED | OUTPATIENT
Start: 2024-12-03

## 2024-12-03 RX ORDER — HYDROCODONE BITARTRATE AND ACETAMINOPHEN 5; 325 MG/1; MG/1
1 TABLET ORAL EVERY 6 HOURS PRN
Qty: 20 TABLET | Refills: 0 | Status: SHIPPED | OUTPATIENT
Start: 2024-12-03

## 2024-12-03 RX ORDER — SODIUM CHLORIDE, SODIUM LACTATE, POTASSIUM CHLORIDE, CALCIUM CHLORIDE 600; 310; 30; 20 MG/100ML; MG/100ML; MG/100ML; MG/100ML
INJECTION, SOLUTION INTRAVENOUS CONTINUOUS
Status: DISCONTINUED | OUTPATIENT
Start: 2024-12-03 | End: 2024-12-03

## 2024-12-03 RX ORDER — LIDOCAINE HYDROCHLORIDE 10 MG/ML
INJECTION, SOLUTION EPIDURAL; INFILTRATION; INTRACAUDAL; PERINEURAL AS NEEDED
Status: DISCONTINUED | OUTPATIENT
Start: 2024-12-03 | End: 2024-12-03 | Stop reason: SURG

## 2024-12-03 RX ORDER — ONDANSETRON 2 MG/ML
INJECTION INTRAMUSCULAR; INTRAVENOUS AS NEEDED
Status: DISCONTINUED | OUTPATIENT
Start: 2024-12-03 | End: 2024-12-03 | Stop reason: SURG

## 2024-12-03 RX ORDER — HYDROMORPHONE HYDROCHLORIDE 1 MG/ML
0.2 INJECTION, SOLUTION INTRAMUSCULAR; INTRAVENOUS; SUBCUTANEOUS EVERY 5 MIN PRN
OUTPATIENT
Start: 2024-12-03

## 2024-12-03 RX ORDER — ONDANSETRON 2 MG/ML
4 INJECTION INTRAMUSCULAR; INTRAVENOUS EVERY 6 HOURS PRN
OUTPATIENT
Start: 2024-12-03

## 2024-12-03 RX ORDER — METOCLOPRAMIDE 10 MG/1
10 TABLET ORAL ONCE
Status: COMPLETED | OUTPATIENT
Start: 2024-12-03 | End: 2024-12-03

## 2024-12-03 RX ORDER — GLYCOPYRROLATE 0.2 MG/ML
INJECTION, SOLUTION INTRAMUSCULAR; INTRAVENOUS AS NEEDED
Status: DISCONTINUED | OUTPATIENT
Start: 2024-12-03 | End: 2024-12-03 | Stop reason: SURG

## 2024-12-03 RX ORDER — FAMOTIDINE 20 MG/1
20 TABLET, FILM COATED ORAL ONCE
Status: COMPLETED | OUTPATIENT
Start: 2024-12-03 | End: 2024-12-03

## 2024-12-03 RX ORDER — HYDROMORPHONE HYDROCHLORIDE 1 MG/ML
0.4 INJECTION, SOLUTION INTRAMUSCULAR; INTRAVENOUS; SUBCUTANEOUS EVERY 5 MIN PRN
OUTPATIENT
Start: 2024-12-03

## 2024-12-03 RX ORDER — DEXTROSE MONOHYDRATE 25 G/50ML
50 INJECTION, SOLUTION INTRAVENOUS
OUTPATIENT
Start: 2024-12-03

## 2024-12-03 RX ORDER — BUPIVACAINE HYDROCHLORIDE AND EPINEPHRINE 2.5; 5 MG/ML; UG/ML
INJECTION, SOLUTION INFILTRATION; PERINEURAL AS NEEDED
Status: DISCONTINUED | OUTPATIENT
Start: 2024-12-03 | End: 2024-12-03 | Stop reason: HOSPADM

## 2024-12-03 RX ORDER — NICOTINE POLACRILEX 4 MG
15 LOZENGE BUCCAL
OUTPATIENT
Start: 2024-12-03

## 2024-12-03 RX ORDER — PROCHLORPERAZINE EDISYLATE 5 MG/ML
5 INJECTION INTRAMUSCULAR; INTRAVENOUS EVERY 8 HOURS PRN
OUTPATIENT
Start: 2024-12-03

## 2024-12-03 RX ORDER — HYDROMORPHONE HYDROCHLORIDE 1 MG/ML
0.6 INJECTION, SOLUTION INTRAMUSCULAR; INTRAVENOUS; SUBCUTANEOUS EVERY 5 MIN PRN
OUTPATIENT
Start: 2024-12-03

## 2024-12-03 RX ORDER — ACETAMINOPHEN 500 MG
1000 TABLET ORAL ONCE
Status: COMPLETED | OUTPATIENT
Start: 2024-12-03 | End: 2024-12-03

## 2024-12-03 RX ORDER — METOCLOPRAMIDE HYDROCHLORIDE 5 MG/ML
10 INJECTION INTRAMUSCULAR; INTRAVENOUS ONCE
Status: COMPLETED | OUTPATIENT
Start: 2024-12-03 | End: 2024-12-03

## 2024-12-03 RX ADMIN — GLYCOPYRROLATE 0.2 MG: 0.2 INJECTION, SOLUTION INTRAMUSCULAR; INTRAVENOUS at 13:07:00

## 2024-12-03 RX ADMIN — ROCURONIUM BROMIDE 50 MG: 10 INJECTION, SOLUTION INTRAVENOUS at 13:02:00

## 2024-12-03 RX ADMIN — MIDAZOLAM HYDROCHLORIDE 2 MG: 1 INJECTION INTRAMUSCULAR; INTRAVENOUS at 12:59:00

## 2024-12-03 RX ADMIN — ONDANSETRON 4 MG: 2 INJECTION INTRAMUSCULAR; INTRAVENOUS at 13:07:00

## 2024-12-03 RX ADMIN — LIDOCAINE HYDROCHLORIDE 50 MG: 10 INJECTION, SOLUTION EPIDURAL; INFILTRATION; INTRACAUDAL; PERINEURAL at 13:02:00

## 2024-12-03 NOTE — ANESTHESIA POSTPROCEDURE EVALUATION
Patient: Reymundo Pitts    Procedure Summary       Date: 12/03/24 Room / Location: [facility] [place] MAIN OR 02 / [facility] [place] MAIN OR    Anesthesia Start: 1259 Anesthesia Stop: 1356    Procedures:       Wide excision pilonidal with flap closure, excision 2 pilar scalp cysts (Buttocks)      Excision lesion head/neck/face (Head) Diagnosis:       Pilonidal disease      (Pilonidal disease [L98.8])    Surgeons: Juan M Rosenberg MD Anesthesiologist: Milan Gutierrez DO    Anesthesia Type: general ASA Status: 2            Anesthesia Type: general    Vitals Value Taken Time   /66 12/03/24 1353   Temp 97.3 12/03/24 1356   Pulse 81 12/03/24 1356   Resp 14 12/03/24 1356   SpO2 98 % 12/03/24 1356   Vitals shown include unfiled device data.    [facility] [place] AN Post Evaluation:   Patient Evaluated in PACU  Patient Participation: complete - patient participated  Level of Consciousness: awake and alert  Pain Score: 0  Pain Management: adequate  Airway Patency:patent  Yes    Nausea/Vomiting: none  Cardiovascular Status: acceptable  Respiratory Status: acceptable  Postoperative Hydration acceptable      Melody Lake CRNA  12/3/2024 1:56 PM

## 2024-12-03 NOTE — TELEPHONE ENCOUNTER
Patient called to check status of forms. Informed forms completed and signed by provider. Patient requested forms Uploaded to [employer]. Request completed.

## 2024-12-03 NOTE — DISCHARGE INSTRUCTIONS
Ice pack as needed.  May shower in 24 hours.  Ibuprofen 600 mg every 6 hours for pain, Norco for breakthrough pain  Take Colace while taking Norco as a stool softener  Avoid vigorous activity heavy lifting or stretching for 2 weeks  Follow-up 1 week for scalp sutures to be removed       HOME INSTRUCTIONS  AMBSURG HOME CARE INSTRUCTIONS: POST-OP ANESTHESIA  The medication that you received for sedation or general anesthesia can last up to 24 hours. Your judgment and reflexes may be altered, even if you feel like your normal self.      We Recommend:   Do not drive any motor vehicle or bicycle   Avoid mowing the lawn, playing sports, or working with power tools/applicances (power saws, electric knives or mixers)   That you have someone stay with you on your first night home   Do not drink alcohol or take sleeping pills or tranquilizers   Do not sign legal documents within 24 hours of your procedure   If you had a nerve block for your surgery, take extra care not to put any pressure on your arm or hand for 24 hours    It is normal:  For you to have a sore throat if you had a breathing tube during surgery (while you were asleep!). The sore throat should get better within 48 hours. You can gargle with warm salt water (1/2 tsp in 4 oz warm water) or use a throat lozenge for comfort  To feel muscle aches or soreness especially in the abdomen, chest or neck. The achy feeling should go away in the next 24 hours  To feel weak, sleepy or \"wiped out\". Your should start feeling better in the next 24 hours.   To experience mild discomforts such as sore lip or tongue, headache, cramps, gas pains or a bloated feeling in your abdomen.   To experience mild back pain or soreness for a day or two if you had spinal or epidural anesthesia.   If you had laparoscopic surgery, to feel shoulder pain or discomfort on the day of surgery.   For some patients to have nausea after surgery/anesthesia    If you feel nausea or experience vomiting:    Try to move around less.   Eat less than usual or drink only liquids until the next morning   Nausea should resolve in about 24 hours    If you have a problem when you are at home:    Call your surgeons office   Discharge Instructions: After Your Surgery  You’ve just had surgery. During surgery, you were given medicine called anesthesia to keep you relaxed and free of pain. After surgery, you may have some pain or nausea. This is common. Here are some tips for feeling better and getting well after surgery.   Going home  Your healthcare provider will show you how to take care of yourself when you go home. They'll also answer your questions. Have an adult family member or friend drive you home. For the first 24 hours after your surgery:   Don't drive or use heavy equipment.  Don't make important decisions or sign legal papers.  Take medicines as directed.  Don't drink alcohol.  Have someone stay with you, if needed. They can watch for problems and help keep you safe.  Be sure to go to all follow-up visits with your healthcare provider. And rest after your surgery for as long as your provider tells you to.   Coping with pain  If you have pain after surgery, pain medicine will help you feel better. Take it as directed, before pain becomes severe. Also, ask your healthcare provider or pharmacist about other ways to control pain. This might be with heat, ice, or relaxation. And follow any other instructions your surgeon or nurse gives you.      Stay on schedule with your medicine.     Tips for taking pain medicine  To get the best relief possible, remember these points:   Pain medicines can upset your stomach. Taking them with a little food may help.  Most pain relievers taken by mouth need at least 20 to 30 minutes to start to work.  Don't wait till your pain becomes severe before you take your medicine. Try to time your medicine so that you can take it before starting an activity. This might be before you get dressed,  go for a walk, or sit down for dinner.  Constipation is a common side effect of some pain medicines. Call your healthcare provider before taking any medicines such as laxatives or stool softeners to help ease constipation. Also ask if you should skip any foods. Drinking lots of fluids and eating foods such as fruits and vegetables that are high in fiber can also help. Remember, don't take laxatives unless your surgeon has prescribed them.  Drinking alcohol and taking pain medicine can cause dizziness and slow your breathing. It can even be deadly. Don't drink alcohol while taking pain medicine.  Pain medicine can make you react more slowly to things. Don't drive or run machinery while taking pain medicine.  Your healthcare provider may tell you to take acetaminophen to help ease your pain. Ask them how much you're supposed to take each day. Acetaminophen or other pain relievers may interact with your prescription medicines or other over-the-counter (OTC) medicines. Some prescription medicines have acetaminophen and other ingredients in them. Using both prescription and OTC acetaminophen for pain can cause you to accidentally overdose. Read the labels on your OTC medicines with care. This will help you to clearly know the list of ingredients, how much to take, and any warnings. It may also help you not take too much acetaminophen. If you have questions or don't understand the information, ask your pharmacist or healthcare provider to explain it to you before you take the OTC medicine.   Managing nausea  Some people have an upset stomach (nausea) after surgery. This is often because of anesthesia, pain, or pain medicine, less movement of food in the stomach, or the stress of surgery. These tips will help you handle nausea and eat healthy foods as you get better. If you were on a special food plan before surgery, ask your healthcare provider if you should follow it while you get better. Check with your provider on how  your eating should progress. It may depend on the surgery you had. These general tips may help:   Don't push yourself to eat. Your body will tell you when to eat and how much.  Start off with clear liquids and soup. They're easier to digest.  Next try semi-solid foods as you feel ready. These include mashed potatoes, applesauce, and gelatin.  Slowly move to solid foods. Don’t eat fatty, rich, or spicy foods at first.  Don't force yourself to have 3 large meals a day. Instead eat smaller amounts more often.  Take pain medicines with a small amount of solid food, such as crackers or toast. This helps prevent nausea.  When to call your healthcare provider  Call your healthcare provider right away if you have any of these:   You still have too much pain, or the pain gets worse, after taking the medicine. The medicine may not be strong enough. Or there may be a complication from the surgery.  You feel too sleepy, dizzy, or groggy. The medicine may be too strong.  Side effects such as nausea or vomiting. Your healthcare provider may advise taking other medicines to .  Skin changes such as rash, itching, or hives. This may mean you have an allergic reaction. Your provider may advise taking other medicines.  The incision looks different (for instance, part of it opens up).  Bleeding or fluid leaking from the incision site, and weren't told to expect that.  Fever of 100.4°F (38°C) or higher, or as directed by your provider.  Call 911  Call 911 right away if you have:   Trouble breathing  Facial swelling    If you have obstructive sleep apnea   You were given anesthesia medicine during surgery to keep you comfortable and free of pain. After surgery, you may have more apnea spells because of this medicine and other medicines you were given. The spells may last longer than normal.    At home:  Keep using the continuous positive airway pressure (CPAP) device when you sleep. Unless your healthcare provider tells you not to, use  it when you sleep, day or night. CPAP is a common device used to treat obstructive sleep apnea.  Talk with your provider before taking any pain medicine, muscle relaxants, or sedatives. Your provider will tell you about the possible dangers of taking these medicines.  Contact your provider if your sleeping changes a lot even when taking medicines as directed.  Jose Guadalupe last reviewed this educational content on 10/1/2021  © 5216-6939 [employer]. All rights reserved. This information is not intended as a substitute for professional medical care. Always follow your healthcare professional's instructions.

## 2024-12-03 NOTE — INTERVAL H&P NOTE
Pre-op Diagnosis: Pilonidal disease [L98.8]    The above referenced H&P was reviewed by Juan M Rosenberg MD on 12/3/2024, the patient was examined and no significant changes have occurred in the patient's condition since the H&P was performed.  I discussed with the patient and/or legal representative the potential benefits, risks and side effects of this procedure; the likelihood of the patient achieving goals; and potential problems that might occur during recuperation.  I discussed reasonable alternatives to the procedure, including risks, benefits and side effects related to the alternatives and risks related to not receiving this procedure.  We will proceed with procedure as planned.

## 2024-12-03 NOTE — ANESTHESIA PREPROCEDURE EVALUATION
Anesthesia PreOp Note    HPI:     Reymundo Pitts is a 34 year old male who presents for preoperative consultation requested by: Juan M Rosenberg MD    Date of Surgery: 12/3/2024    Procedure(s):  Wide excision pilonidal with flap closure, excision 2 pilar scalp cysts  Excision lesion head/neck/face  Indication: Pilonidal disease [L98.8]    Relevant Problems   No relevant active problems       NPO:  Last Liquid Consumption Date: 12/02/24  Last Liquid Consumption Time: 2300  Last Solid Consumption Date: 12/02/24  Last Solid Consumption Time: 2300  Last Liquid Consumption Date: 12/02/24          History Review:  Patient Active Problem List    Diagnosis Date Noted    Cortical age-related cataract of both eyes 11/09/2023    Type 1 diabetes mellitus without complication (HCC) 09/14/2023    Type 1 diabetes mellitus with ketoacidosis without coma (HCC) 05/17/2021    Pectus excavatum 05/18/2017    Diabetic ketoacidosis without coma associated with type 1 diabetes mellitus (HCC) 09/28/2015    Sleep apnea 10/17/2014    Type I diabetes mellitus ([facility]) 03/15/2012       Past Medical History:    Fracture of tibia    High cholesterol    Hx of motion sickness    Learning disability    mild    Sleep apnea    no current    Type I (juvenile type) diabetes mellitus without mention of complication, not stated as uncontrolled    Diagnosed 2011    Varicella       Past Surgical History:   Procedure Laterality Date    Adenoidectomy      Lip surgery proc unlisted  2010    excision of Lip lesion    Myringotomy, laser-assisted  2006    and tube placement    Other surgical history      tonsillectomy    Repair of nasal septum  2005    Septoplasty    Tonsillectomy         Prescriptions Prior to Admission[1]  Current Medications and Prescriptions Ordered in Epic[2]    Allergies[3]    Family History   Problem Relation Age of Onset    Allergies Other         family h/o    High Cholesterol Other         family h/o    Cancer Other         family  h/o    Diabetes Paternal Grandfather     Prostate Cancer Paternal Grandfather     Breast Cancer Paternal Grandmother      Social History     Socioeconomic History    Marital status: Single    Number of children: 0   Occupational History    Occupation: Laid off currently - Heating/AC, [employer]   Tobacco Use    Smoking status: Never    Smokeless tobacco: Never   Vaping Use    Vaping status: Never Used   Substance and Sexual Activity    Alcohol use: Yes     Comment: special occassions    Drug use: No   Other Topics Concern    Caffeine Concern Yes     Comment: soda       Available pre-op labs reviewed.     Lab Results   Component Value Date    PGLU 100 (H) 12/03/2024          Vital Signs:  Body mass index is 33.75 kg/m².   height is 1.803 m (5' 11\") and weight is 109.8 kg (242 lb). His oral temperature is 98.4 °F (36.9 °C). His blood pressure is 136/70 and his pulse is 84. His respiration is 18 and oxygen saturation is 95%.   Vitals:    11/25/24 1716 12/03/24 0913   BP:  136/70   Pulse:  84   Resp:  18   Temp:  98.4 °F (36.9 °C)   TempSrc:  Oral   SpO2:  95%   Weight: 108.9 kg (240 lb) 109.8 kg (242 lb)   Height: 1.702 m (5' 7\") 1.803 m (5' 11\")        Anesthesia Evaluation      Airway   Mallampati: IV  TM distance: >3 FB  Neck ROM: full  Dental - Dentition appears grossly intact     Pulmonary - normal exam   (+) sleep apnea  Cardiovascular - normal exam    Neuro/Psych      GI/Hepatic/Renal      Endo/Other    (+) diabetes mellitus type 1    Comments: Cgm and insulin pump  Abdominal   (-) obese                 Anesthesia Plan:   ASA:  2  Plan:   General  Airway:  ETT  Informed Consent Plan and Risks Discussed With:  Patient  Discussed plan with:  CRNA      I have informed Reymundo Penningtonvarinderjason and/or legal guardian or family member of the nature of the anesthetic plan, benefits, risks including possible dental damage if relevant, major complications, and any alternative forms of anesthetic management.   All of the  patient's questions were answered to the best of my ability. The patient desires the anesthetic management as planned.  Milan Gutierrez, DO  12/3/2024 10:47 AM  Present on Admission:  **None**           [1]   Medications Prior to Admission   Medication Sig Dispense Refill Last Dose/Taking    mupirocin 2 % External Ointment Apply to affect lesions tid 30 g 0 2024    atorvastatin 20 MG Oral Tab Take 1 tablet (20 mg total) by mouth daily.   12/3/2024 at  6:00 AM    HUMALOG 100 UNIT/ML Subcutaneous Solution INJECT 100 UNITS DAILY VIA THE INSULIN PUMP SUBCUTANEOUS   12/3/2024 Morning    [] cephALEXin 500 MG Oral Cap Take 1 capsule (500 mg total) by mouth 3 (three) times daily for 7 days. 21 capsule 0 2024    BD INSULIN SYRINGE U/F 30G X 1/2\" 0.3 ML Does not apply Misc        BD PEN NEEDLE SHORT U/F 31G X 8 MM Does not apply Misc        ONETOUCH ULTRA BLUE In Vitro Strip       [2]   Current Facility-Administered Medications Ordered in Epic   Medication Dose Route Frequency Provider Last Rate Last Admin    lactated ringers infusion   Intravenous Continuous Juan M Rosenberg MD 20 mL/hr at 24 0917 New Bag at 24 0917    ceFAZolin (Ancef) 2g in 10mL IV syringe premix  2 g Intravenous Once Juan M Rosenberg MD         No current [facility]-ordered outpatient medications on file.   [3] No Known Allergies

## 2024-12-03 NOTE — ANESTHESIA PROCEDURE NOTES
Airway  Date/Time: 12/3/2024 1:04 PM  Urgency: Elective    Airway not difficult    General Information and Staff    Patient location during procedure: OR  Resident/CRNA: Melody Lake CRNA  Performed: CRNA   Performed by: Melody Lake CRNA  Authorized by: Milan Gutierrez DO      Indications and Patient Condition  Indications for airway management: anesthesia  Sedation level: deep  Preoxygenated: yes  Patient position: sniffing  Mask difficulty assessment: 1 - vent by mask    Final Airway Details  Final airway type: endotracheal airway      Successful airway: ETT  Cuffed: yes   Successful intubation technique: Video laryngoscopy  Endotracheal tube insertion site: oral  Blade type: Schaeffer.  Blade size: #3  ETT size (mm): 7.5    Cormack-Lehane Classification: grade I - full view of glottis  Placement verified by: capnometry   Measured from: teeth  ETT to teeth (cm): 22  Number of attempts at approach: 1  Number of other approaches attempted: 0    Additional Comments  Dentition and oral mucosa per proep assessment, post intubation. Head/neck remained in neutral position.

## 2024-12-04 NOTE — OPERATIVE REPORT
[facility]    PATIENT'S NAME: BLANCA BARRAGAN   ATTENDING PHYSICIAN: Juan M Rosenberg MD   OPERATING PHYSICIAN: Juan M Rosenberg MD   PATIENT ACCOUNT#:   [id number]    LOCATION:  [address]  MEDICAL RECORD #:   [id number]       YOB: 1990  ADMISSION DATE:       12/03/2024      OPERATION DATE:  12/03/2024    OPERATIVE REPORT      PREOPERATIVE DIAGNOSIS:  Chronic pilonidal disease; pilar cyst x2, 3 cm, 2.5 cm.  POSTOPERATIVE DIAGNOSIS:  Chronic pilonidal disease; pilar cyst x2, 3 cm, 2.5 cm.  PROCEDURE:  Excision, 2 pilar cysts; wide excision, pilonidal disease, with rotational flap closure.    ASSISTANTS:    1.   Karen Jones PA-C.  2.   Jennifer Ragland PA-C.    ESTIMATED BLOOD LOSS:  5 mL.    COMPLICATIONS:  None.    ANESTHESIA:  General.    DISPOSITION:  To Recovery, tolerated well.    INDICATIONS:  Patient is 34 with the above complaint.  Consent obtained.     OPERATIVE TECHNIQUE:  He is taken to surgery in the prone position, prepped and draped in usual sterile fashion.  Wide elliptical incision is made encompassing the pits and the fistula tract to the right of the midline.  This carried down through the subcutaneous tissue down to the fascia.  More Marcaine is infiltrated for local block.  A Karydakis flap is created.  This is rotated medially and closed in layers using interrupted 2-0 Monocryl, 3-0 Monocryl, and Dermabond.  Next, the scalp cysts are excised.  Then, 0.25% Marcaine with epinephrine infiltrated.  Incision made directly over them.  They are excised in their entirety.  Incisions closed with interrupted 4-0 Prolene.  Antibiotic ointment applied.  He tolerated this well.    Dictated By Juan M Rosenberg MD  d: 12/03/2024 13:38:52  t: 12/04/2024 00:10:32  Job [id number]  GL/

## 2024-12-18 ENCOUNTER — NURSE ONLY (OUTPATIENT)
Dept: SURGERY | Facility: CLINIC | Age: 34
End: 2024-12-18
Payer: COMMERCIAL

## 2024-12-18 VITALS — HEART RATE: 89 BPM | SYSTOLIC BLOOD PRESSURE: 116 MMHG | DIASTOLIC BLOOD PRESSURE: 80 MMHG

## 2024-12-18 DIAGNOSIS — Z48.89 ENCOUNTER FOR POSTOPERATIVE CARE: Primary | ICD-10-CM

## 2024-12-18 PROCEDURE — 99024 POSTOP FOLLOW-UP VISIT: CPT

## 2024-12-18 NOTE — PROGRESS NOTES
S:  Reymundo Pitts is a 34 year old male sp pilar cyst excision and pilonidal cystectomy  Doing well, no fevers, no chills, tolerating a general diet, generally normal bowel movements, no calf tenderness nor lower extremity edema, no shortness of breath, no chest pain.       O:  /80   Pulse 89   GEN:  No acute distress  L: nonlabored respirations  H: reg rate  Abd:  Soft, NT,ND.  Skin: Incision C D I, no eryth  Extr: No edema, no calf tenderness    Path:  Reviewed w pt    Assessment   1. Encounter for postoperative care        Doing well post op  Continue to keep incision clean and dry.  Maintain a healthy diet.  Maintain good hydration.  F/u prn.         Ronaldo Meneses PA-C

## 2024-12-27 ENCOUNTER — TELEPHONE (OUTPATIENT)
Dept: SURGERY | Facility: CLINIC | Age: 34
End: 2024-12-27

## 2024-12-27 NOTE — TELEPHONE ENCOUNTER
Pt's message 12-27-24.  Pt was sent home from work today 12-27-24 due not having a doctor's note.  Note stating pt is able to return to work.  Put on mychart chart.  Please call pt

## (undated) DEVICE — SOLUTION IRRIG 1000ML 0.9% NACL USP BTL

## (undated) DEVICE — SUT MCRYL 2-0 36IN CT-1 ABSRB UD L36MM TAPR P

## (undated) DEVICE — SYRINGE MED 10ML LL TIP W/O SFTY DISP

## (undated) DEVICE — GOWN,SIRUS,FAB REINF,RAGLAN,L,STERILE: Brand: MEDLINE

## (undated) DEVICE — POSITIONER HEAD PRONE VOSS

## (undated) DEVICE — ADHESIVE SKIN TOP FOR WND CLSR DERMBND ADV

## (undated) DEVICE — GLOVE SUR 8 SENSICARE PI PIP GRN PWD F

## (undated) DEVICE — GLOVE SUR 8 SENSICARE PI PIP CRM PWD F

## (undated) DEVICE — GLOVE SUR 7 SENSICARE PI MIC PIP CRM PWD F

## (undated) DEVICE — 3M™ MICROFOAM™ TAPE 1528-4: Brand: 3M™ MICROFOAM™

## (undated) DEVICE — SUT PROL 4-0 18IN PS-2 NABSRB BLU L19MM 3/8 C

## (undated) DEVICE — ADHESIVE LIQ 2/3ML VI MASTISOL

## (undated) DEVICE — MINOR GENERAL: Brand: MEDLINE INDUSTRIES, INC.

## (undated) DEVICE — SHEET, T, LAPAROTOMY, STERILE: Brand: MEDLINE

## (undated) DEVICE — SKIN PREP TRAY 4 COMPARTM TRAY: Brand: MEDLINE INDUSTRIES, INC.

## (undated) DEVICE — SUT ETHLN 3-0 30IN FS-1 NABSRB BLK 24MM 3/8 C

## (undated) DEVICE — SHEET,DRAPE,53X77,STERILE: Brand: MEDLINE

## (undated) DEVICE — Device: Brand: JELCO

## (undated) DEVICE — SUT MCRYL 3-0 18IN PS-2 ABSRB UD 19MM 3/8 CIR

## (undated) NOTE — LETTER
[address], [place]  Authorization for Invasive Procedures  1.  I hereby authorize Dr. Anita Mcdowell , my physician and whomever may be designated as the doctor's assistant, to perform the following operation and/or procedure:  *** on Cherry reactions, hemolytic reactions, transmission of disease such as hepatitis, AIDS, cytomegalovirus (CMV), and flluid overload.  In the event that I wish to have autologous transfusions of my own blood, or a directed donor transfusion, I will discuss this with ________________________________________________ Date: _________Time: _________    Responsible person in case of minor or unconscious: _____________________________Relationship: ____________     Witness Signature: _________________________________________

## (undated) NOTE — LETTER
[address], [place]  Authorization for Invasive Procedures  1.  I hereby authorize Dr. Viet Blake , my physician and whomever may be designated as the doctor's assistant, to perform the following operation and/or procedure:  *** on Cherry reactions, hemolytic reactions, transmission of disease such as hepatitis, AIDS, cytomegalovirus (CMV), and flluid overload.  In the event that I wish to have autologous transfusions of my own blood, or a directed donor transfusion, I will discuss this with ________________________________________________ Date: _________Time: _________    Responsible person in case of minor or unconscious: _____________________________Relationship: ____________     Witness Signature: _________________________________________

## (undated) NOTE — LETTER
12/27/2024          To Whom It May Concern:    Reymundo Pitts is currently under my medical care and may return to work effective immediately.    Activity is restricted as follows: none.  If you require additional information please contact our office.        Sincerely,    Juan M Rosenberg MD          Document generated by:  TOMMY CONRAD

## (undated) NOTE — Clinical Note
6/1/2017              [address] [place] [address]         Dear Hurman Runner,      It was a pleasure to see you at our [place] [address] office.   Your lab tests were normal.  There is no

## (undated) NOTE — LETTER
[facility] 2W/SW  [address] [address]  Dept: [phone]  Loc: [phone]  21  ? Re: Chano Heath  : 1990    ?   To Whom It May Concern:    Chano Heath was admitted to [facility] from 0

## (undated) NOTE — LETTER
07/11/20        [facility]  [address]       Dear Rolando Rangel records indicate that you have outstanding lab work and or testing that was ordered for you and has not yet been completed:  Orders Placed This Enc

## (undated) NOTE — MR AVS SNAPSHOT
[facility]  [address] [place] [address], [address] [place] [address]  Renae Natarajan               Thank you for choosing us for your health care visit with Jeannie Mcneal DO.   We are glad to serve you and happy to provide you with this summary Normal Orders This Visit    ENDOCRINE - EXTERNAL [474609 CPT(R)]  Order #:  [id number]         **REFERRAL REQUEST**    Your physician has referred you to a specialist.  Your physician or the clinic staff will provide you with the phone number you should c Medical Issues Discussed Today     Pectus excavatum    Type I diabetes mellitus    Annual physical exam    -  Primary    Need for vaccination          Instructions and Information about Your Health     None      Allergies as of May 18, 2017     No Known Al active are less likely to develop some chronic diseases than adults who are inactive.      HOW TO GET STARTED: HOW TO STAY MOTIVATED:   Start activities slowly and build up over time Do what you like   Get your heart pumping – brisk walking, biking, swimmin

## (undated) NOTE — LETTER
6/2/2021              Teressa Cui        [address]        [facility] [address]         Dear Mary Beth Harrison,    This letter is to inform you that our office has made several attempts to reach you by phone without success. We were attempting to contact you by phone regarding prescription request    Please contact our office at the number listed below as soon as you receive this letter to discuss this issue and to make the necessary changes in our system to your contact information. Thank you for your cooperation.         Sincerely,    JACOB Tucker [address] [place] [address], [address] [place] [address] [address]  [phone]

## (undated) NOTE — LETTER
CONTINUOUS GLUCOSE MONITOR AND INSULIN PUMP AGREEMENT     CONTINUOUS GLUCOSE MONITOR (CGM) (If not signed, not applicable)     CGMs are not currently approved by the FDA for use in the hospital. If you choose to continue to wear your CGM in the hospital, w your infusion site at least every 3 days, and as needed for skin irritation or two consecutive glucose readings       greater than 240 mg/dL.    •   Make no changes to your basal rates, carb ratios, or correction factor unless directed to do so by the physi Checklists. ? Apply patient label to Agreement and have patient read and sign it. Place on chart. ? Add Insulin Pump LDA to the IV Assessment flowsheet. ? Endocrinology to be consulted (except at [facility]):    ? [place]: Attending to initiate consult. measured in interstitial fluid by a tiny electrode under the skin. · The CGM can be worn up to 10-14 days, depending on the model. · Often used along with an insulin pump, but may also be a stand-alone device.       ON ADMISSION     •   Document CGM in : 1990                 Printed: May 18, 2021     Medical Record #: [id number]                                            Page

## (undated) NOTE — LETTER
6/2/2023              Corazonashley Evangelista        [address]        [place] [address]         To Whom It May Concern,      Please allow Hadley Natarajan to carry his mobile phone at work. The phone interacts with his diabetic equipment to alert him when his blood sugars are high or low. He needs these alerts to know how to manage his medical condition. Please contact us with questions or concerns. Sincerely,    Jose Miguel Toribio, PA-C  No primary provider on file.   [phone]            Document electronically generated by:  Susannah Prater RN

## (undated) NOTE — LETTER
11/18/2024          To Whom It May Concern:    Reymundo Pitts is currently under my medical care.  He was seen in the office today 11-. He requires surgery and is scheduled for 12-3-2024.  He will require medical leave for recovery.  We anticipate a medical leave two to three weeks following the surgery.     If you require additional information please contact our office.      Sincerely,        Juan M Rosenberg MD          Document generated by:  Shannan SORIANO CMA

## (undated) NOTE — LETTER
November 9, 2023      No Recipients     Patient: Shabnam Madera   YOB: 1990   Date of Visit: 11/9/2023       Dear Dr. Heath Coon Recipients: Thank you for referring Shabnam Madera to me for evaluation. Here is my assessment and plan of care:    Shabnam Madera is a 29year old male. HPI:     HPI    NP is here for diabetic eye exam.  Pt was last seen by Optometrist about 6 months ago. Pt did not bring his distance glasses with him. He uses them mostly for night driving. Pt denies any eye trouble or vision changes. Pt has been a diabetic for 14 years       Pt's diabetes is currently controlled by insulin  Pt checks BS every day   Pt's last blood sugar was  108 this morning  Last HA1C was 6.0 on 3/27/23  Endocrinologist: Amy Arias           Last edited by Pee Trinh OPJ on 11/9/2023 10:14 AM.        Patient History:  Past Medical History:   Diagnosis Date    Fracture of tibia     Learning disability     mild    Type I (juvenile type) diabetes mellitus without mention of complication, not stated as uncontrolled     Diagnosed 2011    Varicella 1995       Surgical History: Shabnam Madera has a past surgical history that includes repair of nasal septum (2005) (Septoplasty); myringotomy, laser-assisted (2006) (and tube placement); tonsillectomy; adenoidectomy; lip surgery proc unlisted (2010) (excision of Lip lesion); and other surgical history (tonsillectomy).     Family History   Problem Relation Age of Onset    Allergies Other         family h/o    High Cholesterol Other         family h/o    Cancer Other         family h/o    Diabetes Paternal Grandfather     Prostate Cancer Paternal Grandfather     Breast Cancer Paternal Grandmother        Social History:   Social History     Socioeconomic History    Marital status: Single    Number of children: 0   Occupational History    Occupation: Laid off currently - Heating/AC, Landscaping   Tobacco Use    Smoking status: Never    Smokeless tobacco: Never   Vaping Use    Vaping Use: Never used   Substance and Sexual Activity    Alcohol use: Yes     Comment: rarely    Drug use: No   Other Topics Concern    Caffeine Concern Yes     Comment: soda       Medications:  Current Outpatient Medications   Medication Sig Dispense Refill    atorvastatin 20 MG Oral Tab Take 1 tablet (20 mg total) by mouth daily. terbinafine 250 MG Oral Tab Take 1 tablet (250 mg total) by mouth daily.  90 tablet 0    HUMALOG 100 UNIT/ML Subcutaneous Solution INJECT 100 UNITS DAILY VIA THE INSULIN PUMP SUBCUTANEOUS      BD INSULIN SYRINGE U/F 30G X 1/2\" 0.3 ML Does not apply Misc       BD PEN NEEDLE SHORT U/F 31G X 8 MM Does not apply Misc       ONETOUCH ULTRA BLUE In Vitro Strip          Allergies:  No Known Allergies    ROS:       PHYSICAL EXAM:     Base Eye Exam       Visual Acuity (Snellen - Linear)         Right Left    Dist sc 20/40 -2 20/20 -1    Near sc 20/20 20/20              Tonometry (Icare, 10:17 AM)         Right Left    Pressure 12 10              Pupils         Pupils    Right PERRL    Left PERRL              Visual Fields         Left Right     Full Full              Extraocular Movement         Right Left     Full, Ortho Full, Ortho              Neuro/Psych       Oriented x3: Yes    Mood/Affect: Normal              Dilation       Both eyes: 1.0% Mydriacyl and 2.5% Tripp Synephrine @ 10:17 AM                  Slit Lamp and Fundus Exam       Slit Lamp Exam         Right Left    Lids/Lashes Meibomian gland dysfunction Meibomian gland dysfunction    Conjunctiva/Sclera Normal Normal    Cornea Clear Clear    Anterior Chamber Deep and quiet Deep and quiet    Iris Normal Normal    Lens 1+ anterior cortical, 1+ PSC 1+ anterior cortical, trace PSC    Vitreous Clear Clear              Fundus Exam         Right Left    Disc Good rim Good rim    C/D Ratio 0.3 0.3    Macula Normal-no BDR Normal-no BDR    Vessels Normal Normal    Periphery Normal Normal Refraction       Wearing Rx       Type: Forgot glasses              Manifest Refraction (Auto)         Sphere Cylinder Axis    Right +0.75 +1.75 095    Left +1.00 +0.25 100   Patient is happy with his glasses which are 10 months old                    ASSESSMENT/PLAN:     Diagnoses and Plan:     Type 1 diabetes mellitus without complication ([address]  Diabetes type I: no background retinopathy, no signs of neovascularization noted. Discussed ocular and systemic benefits of blood sugar control. Will see patient in 1 year for a diabetic exam    Cortical age-related cataract of both eyes  Discussed early cataracts with patient. No treatment recommended at this time. Continue with same glasses    No orders of the defined types were placed in this encounter. Meds This Visit:  Requested Prescriptions      No prescriptions requested or ordered in this encounter        Follow up instructions:  Return in about 1 year (around 11/9/2024) for Diabetic eye exam.    11/9/2023  Scribed by: Karyn Zelaya MD        If you have questions, please do not hesitate to call me. I look forward to following Giovana Faust along with you.     Sincerely,        Karyn Zelaya MD        CC:   No Recipients    Document electronically generated by: Karyn Zelaya MD

## (undated) NOTE — LETTER
11/21/20        Jon Hutchinson  [address]  [place] [address]      Dear Rolando Rangel records indicate that you have outstanding lab work and or testing that was ordered for you and has not yet been completed:  Orders Placed This Enc